# Patient Record
Sex: MALE | Race: WHITE | NOT HISPANIC OR LATINO | Employment: FULL TIME | URBAN - METROPOLITAN AREA
[De-identification: names, ages, dates, MRNs, and addresses within clinical notes are randomized per-mention and may not be internally consistent; named-entity substitution may affect disease eponyms.]

---

## 2017-10-23 ENCOUNTER — TRANSCRIBE ORDERS (OUTPATIENT)
Dept: NUTRITION | Facility: HOSPITAL | Age: 22
End: 2017-10-23

## 2017-10-23 DIAGNOSIS — E11.9 DIABETES MELLITUS WITHOUT COMPLICATION (HCC): Primary | ICD-10-CM

## 2017-11-10 ENCOUNTER — HOSPITAL ENCOUNTER (OUTPATIENT)
Dept: NUTRITION | Facility: HOSPITAL | Age: 22
Setting detail: RECURRING SERIES
Discharge: HOME | End: 2017-11-10

## 2017-11-10 VITALS — BODY MASS INDEX: 25.73 KG/M2 | WEIGHT: 190 LBS | HEIGHT: 72 IN

## 2017-11-10 PROCEDURE — 97802 MEDICAL NUTRITION INDIV IN: CPT | Performed by: DIETITIAN, REGISTERED

## 2017-12-06 ENCOUNTER — HOSPITAL ENCOUNTER (OUTPATIENT)
Dept: NUTRITION | Facility: HOSPITAL | Age: 22
Setting detail: RECURRING SERIES
Discharge: HOME | End: 2017-12-06

## 2017-12-06 VITALS — BODY MASS INDEX: 25.33 KG/M2 | HEIGHT: 72 IN | WEIGHT: 187 LBS

## 2021-08-16 RX ORDER — SERTRALINE HYDROCHLORIDE 25 MG/1
TABLET, FILM COATED ORAL
Qty: 33 TABLET | Refills: 0 | OUTPATIENT
Start: 2021-08-16

## 2022-04-15 ENCOUNTER — OFFICE VISIT (OUTPATIENT)
Dept: FAMILY MEDICINE CLINIC | Facility: CLINIC | Age: 27
End: 2022-04-15

## 2022-04-15 VITALS
HEART RATE: 109 BPM | BODY MASS INDEX: 24.65 KG/M2 | SYSTOLIC BLOOD PRESSURE: 140 MMHG | WEIGHT: 182 LBS | OXYGEN SATURATION: 99 % | HEIGHT: 72 IN | DIASTOLIC BLOOD PRESSURE: 90 MMHG

## 2022-04-15 DIAGNOSIS — F41.9 ANXIETY: Primary | ICD-10-CM

## 2022-04-15 DIAGNOSIS — F32.A DEPRESSION, UNSPECIFIED DEPRESSION TYPE: ICD-10-CM

## 2022-04-15 DIAGNOSIS — I10 PRIMARY HYPERTENSION: ICD-10-CM

## 2022-04-15 DIAGNOSIS — E10.9 TYPE 1 DIABETES MELLITUS WITHOUT COMPLICATION: ICD-10-CM

## 2022-04-15 PROCEDURE — 99214 OFFICE O/P EST MOD 30 MIN: CPT | Performed by: NURSE PRACTITIONER

## 2022-04-15 RX ORDER — DISULFIRAM 250 MG/1
250 TABLET ORAL DAILY
COMMUNITY
Start: 2021-09-13 | End: 2022-04-15

## 2022-04-15 RX ORDER — LISINOPRIL 20 MG/1
20 TABLET ORAL DAILY
Qty: 90 TABLET | Refills: 1 | Status: SHIPPED | OUTPATIENT
Start: 2022-04-15 | End: 2022-08-23 | Stop reason: SDUPTHER

## 2022-04-15 RX ORDER — LISINOPRIL 20 MG/1
20 TABLET ORAL
COMMUNITY
End: 2022-04-15

## 2022-04-15 RX ORDER — FLUOXETINE HYDROCHLORIDE 20 MG/1
20 CAPSULE ORAL DAILY
Qty: 30 CAPSULE | Refills: 5 | Status: SHIPPED | OUTPATIENT
Start: 2022-04-15 | End: 2022-08-23 | Stop reason: SDUPTHER

## 2022-04-15 RX ORDER — CLINDAMYCIN HYDROCHLORIDE 300 MG/1
300 CAPSULE ORAL 4 TIMES DAILY
COMMUNITY
Start: 2022-02-02 | End: 2022-04-15

## 2022-04-15 RX ORDER — CARVEDILOL 12.5 MG/1
12.5 TABLET ORAL
COMMUNITY
End: 2022-04-15

## 2022-04-15 RX ORDER — BUSPIRONE HYDROCHLORIDE 7.5 MG/1
7.5 TABLET ORAL 2 TIMES DAILY
Qty: 60 TABLET | Refills: 5 | Status: SHIPPED | OUTPATIENT
Start: 2022-04-15 | End: 2022-08-23 | Stop reason: SDUPTHER

## 2022-04-15 RX ORDER — LEVOCETIRIZINE DIHYDROCHLORIDE 5 MG/1
5 TABLET, FILM COATED ORAL DAILY
COMMUNITY
Start: 2022-03-17 | End: 2022-10-24 | Stop reason: SDUPTHER

## 2022-04-15 NOTE — ASSESSMENT & PLAN NOTE
Patient sees endocrinology for diabetes.  Continue current medications.  I will send an order for his Dexcom supplies when he brings me the list.

## 2022-04-15 NOTE — PROGRESS NOTES
"Chief Complaint  Diabetes    Subjective          Parth Bright presents to [facility]  Patient is here today to discuss his anxiety.  He has had increased feelings of anxiety and depression in the past few months.  He is dealing with custody issues with his children and that has definitely worsened his symptoms.  He sees endocrinology for his diabetes which has not been controlled at times.  He has had periods where he has not been taking his insulin.  He states he gets his insulin from [facility] and does not need a prescription for that.      Objective   Vital Signs:   /90   Pulse 109   Ht 182.9 cm (72\")   Wt 82.6 kg (182 lb)   SpO2 99%   BMI 24.68 kg/m²     Body mass index is 24.68 kg/m².    Review of Systems   Constitutional: Negative for fatigue and fever.   Respiratory: Negative for shortness of breath.    Cardiovascular: Negative for chest pain, palpitations and leg swelling.   Neurological: Negative for syncope.   Psychiatric/Behavioral: The patient is nervous/anxious.         Negative depression          Current Outpatient Medications:   •  HumaLOG 100 UNIT/ML injection, , Disp: , Rfl:   •  Insulin Glargine (LANTUS SOLOSTAR) 100 UNIT/ML injection pen, 35 units at night, Disp: , Rfl:   •  Insulin Lispro (HUMALOG PEN SC), per SSI provided today qa, Disp: , Rfl:   •  levocetirizine (XYZAL) 5 MG tablet, Take 5 mg by mouth Daily., Disp: , Rfl:   •  lisinopril (PRINIVIL,ZESTRIL) 20 MG tablet, Take 1 tablet by mouth Daily., Disp: 90 tablet, Rfl: 1  •  busPIRone (BUSPAR) 7.5 MG tablet, Take 1 tablet by mouth 2 (Two) Times a Day., Disp: 60 tablet, Rfl: 5  •  FLUoxetine (PROzac) 20 MG capsule, Take 1 capsule by mouth Daily., Disp: 30 capsule, Rfl: 5      Allergies: Penicillins    Physical Exam  Constitutional:       Appearance: Normal appearance.   HENT:      Head: Normocephalic.   Eyes:      Conjunctiva/sclera: Conjunctivae normal.      Pupils: Pupils are equal, round, and " reactive to light.   Cardiovascular:      Rate and Rhythm: Normal rate and regular rhythm.      Heart sounds: Normal heart sounds.   Pulmonary:      Effort: Pulmonary effort is normal.      Breath sounds: Normal breath sounds.   Abdominal:      Tenderness: There is no abdominal tenderness.   Musculoskeletal:         General: Normal range of motion.   Skin:     General: Skin is warm and dry.      Capillary Refill: Capillary refill takes less than 2 seconds.   Neurological:      General: No focal deficit present.      Mental Status: He is alert and oriented to person, place, and time.   Psychiatric:         Mood and Affect: Mood normal.         Behavior: Behavior normal.         Thought Content: Thought content normal.         Judgment: Judgment normal.          Result Review :                   Assessment and Plan    Diagnoses and all orders for this visit:    1. Anxiety (Primary)  Assessment & Plan:  Begin medications.  Follow-up in 1 month or sooner if worsen.    Orders:  -     FLUoxetine (PROzac) 20 MG capsule; Take 1 capsule by mouth Daily.  Dispense: 30 capsule; Refill: 5  -     busPIRone (BUSPAR) 7.5 MG tablet; Take 1 tablet by mouth 2 (Two) Times a Day.  Dispense: 60 tablet; Refill: 5    2. Depression, unspecified depression type  Assessment & Plan:  Begin medications.  Follow-up in 1 month or sooner if worsened.    Orders:  -     FLUoxetine (PROzac) 20 MG capsule; Take 1 capsule by mouth Daily.  Dispense: 30 capsule; Refill: 5    3. Type 1 diabetes mellitus without complication (HCC)  Assessment & Plan:  Patient sees endocrinology for diabetes.  Continue current medications.  I will send an order for his Dexcom supplies when he brings me the list.      4. Primary hypertension  Assessment & Plan:  Restart medications.  Monitor blood pressure.      Other orders  -     lisinopril (PRINIVIL,ZESTRIL) 20 MG tablet; Take 1 tablet by mouth Daily.  Dispense: 90 tablet; Refill: 1            BMI is within normal  parameters. No follow-up required.       Follow Up   Return in about 1 month (around 5/15/2022) for Recheck.  Patient was given instructions and counseling regarding his condition or for health maintenance advice. Please see specific information pulled into the AVS if appropriate.     CIARA Chun

## 2022-05-16 ENCOUNTER — OFFICE VISIT (OUTPATIENT)
Dept: FAMILY MEDICINE CLINIC | Facility: CLINIC | Age: 27
End: 2022-05-16

## 2022-05-16 ENCOUNTER — TELEPHONE (OUTPATIENT)
Dept: FAMILY MEDICINE CLINIC | Facility: CLINIC | Age: 27
End: 2022-05-16

## 2022-05-16 VITALS
DIASTOLIC BLOOD PRESSURE: 82 MMHG | SYSTOLIC BLOOD PRESSURE: 122 MMHG | HEART RATE: 111 BPM | BODY MASS INDEX: 24.79 KG/M2 | HEIGHT: 72 IN | OXYGEN SATURATION: 98 % | WEIGHT: 183 LBS

## 2022-05-16 DIAGNOSIS — E10.9 TYPE 1 DIABETES MELLITUS WITHOUT COMPLICATION: ICD-10-CM

## 2022-05-16 DIAGNOSIS — F41.9 ANXIETY: Primary | ICD-10-CM

## 2022-05-16 PROBLEM — F10.20 SEVERE ALCOHOL USE DISORDER: Status: ACTIVE | Noted: 2021-07-19

## 2022-05-16 PROCEDURE — 99213 OFFICE O/P EST LOW 20 MIN: CPT | Performed by: NURSE PRACTITIONER

## 2022-05-16 NOTE — PROGRESS NOTES
"Chief Complaint  Anxiety    Subjective          Parth Bright presents to [facility]  Patient is here for follow-up on his anxiety.  He feels the medication had been working but he stopped taking it last week when he had a stomach bug.  He needs refills on his insulin and pump supplies.  He needs refills on his Dexcom supplies.      Objective   Vital Signs:   /82   Pulse 111   Ht 182.9 cm (72\")   Wt 83 kg (183 lb)   SpO2 98%   BMI 24.82 kg/m²     Body mass index is 24.82 kg/m².    Review of Systems   Constitutional: Negative for fatigue and fever.   Respiratory: Negative for shortness of breath.    Cardiovascular: Negative for chest pain, palpitations and leg swelling.   Neurological: Negative for syncope.   Psychiatric/Behavioral: The patient is not nervous/anxious.         Negative depression          Current Outpatient Medications:   •  busPIRone (BUSPAR) 7.5 MG tablet, Take 1 tablet by mouth 2 (Two) Times a Day., Disp: 60 tablet, Rfl: 5  •  FLUoxetine (PROzac) 20 MG capsule, Take 1 capsule by mouth Daily., Disp: 30 capsule, Rfl: 5  •  HumaLOG 100 UNIT/ML injection, Inject SC with insulin pump, as directed, Disp: 30 mL, Rfl: 5  •  Insulin Lispro (HUMALOG PEN SC), per SSI provided today [facility], Disp: , Rfl:   •  levocetirizine (XYZAL) 5 MG tablet, Take 5 mg by mouth Daily., Disp: , Rfl:   •  Insulin Glargine (LANTUS SOLOSTAR) 100 UNIT/ML injection pen, 35 units at night, Disp: , Rfl:   •  lisinopril (PRINIVIL,ZESTRIL) 20 MG tablet, Take 1 tablet by mouth Daily., Disp: 90 tablet, Rfl: 1      Allergies: Penicillins    Physical Exam  Constitutional:       Appearance: Normal appearance.   HENT:      Head: Normocephalic.   Eyes:      Conjunctiva/sclera: Conjunctivae normal.      Pupils: Pupils are equal, round, and reactive to light.   Cardiovascular:      Rate and Rhythm: Normal rate and regular rhythm.      Heart sounds: Normal heart sounds.   Pulmonary:      Effort: Pulmonary " effort is normal.      Breath sounds: Normal breath sounds.   Abdominal:      Tenderness: There is no abdominal tenderness.   Musculoskeletal:         General: Normal range of motion.   Skin:     General: Skin is warm and dry.      Capillary Refill: Capillary refill takes less than 2 seconds.   Neurological:      General: No focal deficit present.      Mental Status: He is alert and oriented to person, place, and time.   Psychiatric:         Mood and Affect: Mood normal.         Behavior: Behavior normal.         Thought Content: Thought content normal.         Judgment: Judgment normal.          Result Review :                   Assessment and Plan    Diagnoses and all orders for this visit:    1. Anxiety (Primary)  Comments:  Continue current medications.  Follow-up in 1 month.  We may adjust medication if needed.    2. Type 1 diabetes mellitus without complication (HCC)  Comments:  Continue insulin as directed.  Check glucose 3 times daily and as needed.  We discussed the importance of compliance to avoid complications.  Orders:  -     HumaLOG 100 UNIT/ML injection; Inject SC with insulin pump, as directed  Dispense: 30 mL; Refill: 5              BMI is within normal parameters. No follow-up required.       Follow Up   Return in about 6 months (around 11/16/2022) for Recheck.  Patient was given instructions and counseling regarding his condition or for health maintenance advice. Please see specific information pulled into the AVS if appropriate.     CIARA Chun

## 2022-07-26 ENCOUNTER — TELEPHONE (OUTPATIENT)
Dept: FAMILY MEDICINE CLINIC | Facility: CLINIC | Age: 27
End: 2022-07-26

## 2022-07-26 NOTE — TELEPHONE ENCOUNTER
Caller: BELKIS    Relationship: [facility]    Best call back number: [phone]    What form or medical record are you requesting: EDITH, TIFFANIE 4 CDMS    Who is requesting this form or medical record from you: [facility]    How would you like to receive the form or medical records (pick-up, mail, fax): FAX # - [phone]    Timeframe paperwork needed: ASAP    Additional notes: BELKIS FROM [facility] FAXED ON 07/19/22 FOR PAPERWORK FOR PATIENT. BELKIS HAS CORRECT FAX # AND I ASKED BELKIS TO REFAX THE PAPERWORK BACK OVER TO US.

## 2022-08-05 DIAGNOSIS — E10.9 TYPE 1 DIABETES MELLITUS WITHOUT COMPLICATION: ICD-10-CM

## 2022-08-05 NOTE — TELEPHONE ENCOUNTER
Incoming Refill Request      Medication requested (name and dose): [facility]    Pharmacy where request should be sent: [facility]    Additional details provided by patient: PT IS ALMOST OUT, DOES NOT HAVE ENOUGH TO FILL  INSULIN PUMP    Best call back number: [phone]    Does the patient have less than a 3 day supply:  [x] Yes  [] No    Pallavi Brown Rep  08/05/22, 14:27 EDT

## 2022-08-05 NOTE — TELEPHONE ENCOUNTER
"We've not prescribed since 2019 it looks like. I'll forward to crystal to see if we can.     Directions from pharm say \"inject SQ with insulin pump, as directed\"    Christiana says \"inject 5-10units by SQ at each meal per sliding scale\"   "

## 2022-08-23 ENCOUNTER — TELEPHONE (OUTPATIENT)
Dept: FAMILY MEDICINE CLINIC | Facility: CLINIC | Age: 27
End: 2022-08-23

## 2022-08-23 ENCOUNTER — OFFICE VISIT (OUTPATIENT)
Dept: FAMILY MEDICINE CLINIC | Facility: CLINIC | Age: 27
End: 2022-08-23

## 2022-08-23 VITALS
HEART RATE: 93 BPM | OXYGEN SATURATION: 99 % | SYSTOLIC BLOOD PRESSURE: 110 MMHG | RESPIRATION RATE: 16 BRPM | WEIGHT: 188.8 LBS | DIASTOLIC BLOOD PRESSURE: 80 MMHG | BODY MASS INDEX: 25.57 KG/M2 | HEIGHT: 72 IN | TEMPERATURE: 97.6 F

## 2022-08-23 DIAGNOSIS — F41.9 ANXIETY: ICD-10-CM

## 2022-08-23 DIAGNOSIS — E10.9 TYPE 1 DIABETES MELLITUS WITHOUT COMPLICATION: ICD-10-CM

## 2022-08-23 DIAGNOSIS — F32.A DEPRESSION, UNSPECIFIED DEPRESSION TYPE: ICD-10-CM

## 2022-08-23 DIAGNOSIS — L98.9 SKIN LESION: Primary | ICD-10-CM

## 2022-08-23 DIAGNOSIS — I10 PRIMARY HYPERTENSION: ICD-10-CM

## 2022-08-23 PROCEDURE — 99213 OFFICE O/P EST LOW 20 MIN: CPT | Performed by: NURSE PRACTITIONER

## 2022-08-23 RX ORDER — FLUOXETINE HYDROCHLORIDE 20 MG/1
20 CAPSULE ORAL DAILY
Qty: 90 CAPSULE | Refills: 3 | Status: SHIPPED | OUTPATIENT
Start: 2022-08-23 | End: 2023-03-23

## 2022-08-23 RX ORDER — BUSPIRONE HYDROCHLORIDE 7.5 MG/1
7.5 TABLET ORAL 2 TIMES DAILY
Qty: 180 TABLET | Refills: 3 | Status: SHIPPED | OUTPATIENT
Start: 2022-08-23 | End: 2023-03-23

## 2022-08-23 RX ORDER — LISINOPRIL 20 MG/1
20 TABLET ORAL DAILY
Qty: 90 TABLET | Refills: 3 | Status: SHIPPED | OUTPATIENT
Start: 2022-08-23 | End: 2022-10-24

## 2022-08-23 RX ORDER — ASPIRIN 81 MG/1
TABLET, FILM COATED ORAL
COMMUNITY
Start: 2022-07-25 | End: 2022-10-24

## 2022-08-23 NOTE — TELEPHONE ENCOUNTER
Caller: [facility] [address] [place] [address] - [phone] [facility] [phone] FX    Relationship: Pharmacy    Best call back number: [phone]    Which medication are you concerned about: HumaLOG 100 UNIT/ML injection    What are your concerns: PHARMACY NEEDS TO KNOW THE MAXIMUM UNITS THE PATIENT CAN TAKE IN A DAY.    PLEASE CALL BACK TO ADVISE.     THANK YOU.

## 2022-08-23 NOTE — PROGRESS NOTES
"Chief Complaint  Diabetes (Routine check up. PT is not fasting)    Subjective          Parth Bright presents to [facility]  Pt is here for follow up on diabetes. He has been trying to watch his diet more closely. He been wearing his insulin pump and his glucose runs under 200 for the most part. He had his A1C tested for surgery last month which was 8. He ran out of Buspar and Prozac and feels he needs to restart them. He has had a skin lesion on his left arm.      Objective   Vital Signs:   /80 (BP Location: Left arm, Patient Position: Sitting, Cuff Size: Adult)   Pulse 93   Temp 97.6 °F (36.4 °C)   Resp 16   Ht 182.9 cm (72\")   Wt 85.6 kg (188 lb 12.8 oz)   SpO2 99%   BMI 25.61 kg/m²     Body mass index is 25.61 kg/m².    Review of Systems   Constitutional: Negative for fatigue and fever.   Respiratory: Negative for shortness of breath.    Cardiovascular: Negative for chest pain, palpitations and leg swelling.   Neurological: Negative for syncope.   Psychiatric/Behavioral: The patient is not nervous/anxious.           Current Outpatient Medications:   •  Adult Aspirin Regimen 81 MG EC tablet, , Disp: , Rfl:   •  busPIRone (BUSPAR) 7.5 MG tablet, Take 1 tablet by mouth 2 (Two) Times a Day., Disp: 180 tablet, Rfl: 3  •  FLUoxetine (PROzac) 20 MG capsule, Take 1 capsule by mouth Daily., Disp: 90 capsule, Rfl: 3  •  HumaLOG 100 UNIT/ML injection, Inject SC with insulin pump, as directed, Disp: 90 mL, Rfl: 5  •  lisinopril (PRINIVIL,ZESTRIL) 20 MG tablet, Take 1 tablet by mouth Daily., Disp: 90 tablet, Rfl: 3  •  levocetirizine (XYZAL) 5 MG tablet, Take 5 mg by mouth Daily., Disp: , Rfl:       Allergies: Penicillins, Citrullus vulgaris, Nuts, Levofloxacin, and Nickel    Physical Exam  Constitutional:       Appearance: Normal appearance.   HENT:      Head: Normocephalic.   Eyes:      Conjunctiva/sclera: Conjunctivae normal.      Pupils: Pupils are equal, round, and reactive to " light.   Cardiovascular:      Rate and Rhythm: Normal rate and regular rhythm.      Heart sounds: Normal heart sounds.   Pulmonary:      Effort: Pulmonary effort is normal.      Breath sounds: Normal breath sounds.   Abdominal:      Tenderness: There is no abdominal tenderness.   Musculoskeletal:         General: Normal range of motion.   Skin:     General: Skin is warm and dry.      Capillary Refill: Capillary refill takes less than 2 seconds.      Comments: Skin lesion left arm   Neurological:      General: No focal deficit present.      Mental Status: He is alert and oriented to person, place, and time.   Psychiatric:         Mood and Affect: Mood normal.         Behavior: Behavior normal.         Thought Content: Thought content normal.         Judgment: Judgment normal.          Result Review :                   Assessment and Plan    Diagnoses and all orders for this visit:    1. Skin lesion (Primary)  Comments:  Follow up with derm.  Orders:  -     Ambulatory Referral to Dermatology    2. Type 1 diabetes mellitus without complication (HCC)  Comments:  Continue insulin as directed.  Check glucose 3 times daily and as needed. Return for labs in 3 months.   Orders:  -     HumaLOG 100 UNIT/ML injection; Inject SC with insulin pump, as directed  Dispense: 90 mL; Refill: 5    3. Anxiety  Comments:  Restart meds. Return in 1 month if not improving.   Orders:  -     busPIRone (BUSPAR) 7.5 MG tablet; Take 1 tablet by mouth 2 (Two) Times a Day.  Dispense: 180 tablet; Refill: 3  -     FLUoxetine (PROzac) 20 MG capsule; Take 1 capsule by mouth Daily.  Dispense: 90 capsule; Refill: 3    4. Depression, unspecified depression type  Comments:  Restart meds. Return in 1 month if not improving.   Orders:  -     FLUoxetine (PROzac) 20 MG capsule; Take 1 capsule by mouth Daily.  Dispense: 90 capsule; Refill: 3    5. Primary hypertension  Comments:  Continue current meds.   Orders:  -     lisinopril (PRINIVIL,ZESTRIL) 20 MG  tablet; Take 1 tablet by mouth Daily.  Dispense: 90 tablet; Refill: 3                Follow Up   Return in about 6 months (around 2/23/2023) for Recheck.  Patient was given instructions and counseling regarding his condition or for health maintenance advice. Please see specific information pulled into the AVS if appropriate.     CIARA Chun

## 2022-10-24 ENCOUNTER — OFFICE VISIT (OUTPATIENT)
Dept: FAMILY MEDICINE CLINIC | Facility: CLINIC | Age: 27
End: 2022-10-24

## 2022-10-24 VITALS
SYSTOLIC BLOOD PRESSURE: 122 MMHG | WEIGHT: 198 LBS | BODY MASS INDEX: 26.82 KG/M2 | OXYGEN SATURATION: 98 % | HEIGHT: 72 IN | HEART RATE: 98 BPM | DIASTOLIC BLOOD PRESSURE: 82 MMHG

## 2022-10-24 DIAGNOSIS — Z00.00 ROUTINE MEDICAL EXAM: Primary | ICD-10-CM

## 2022-10-24 DIAGNOSIS — E10.9 TYPE 1 DIABETES MELLITUS WITHOUT COMPLICATION: ICD-10-CM

## 2022-10-24 DIAGNOSIS — R53.83 OTHER FATIGUE: ICD-10-CM

## 2022-10-24 DIAGNOSIS — E56.9 VITAMIN DEFICIENCY: ICD-10-CM

## 2022-10-24 DIAGNOSIS — F41.1 GENERALIZED ANXIETY DISORDER: ICD-10-CM

## 2022-10-24 DIAGNOSIS — J30.1 ALLERGIC RHINITIS DUE TO POLLEN, UNSPECIFIED SEASONALITY: ICD-10-CM

## 2022-10-24 DIAGNOSIS — J01.00 ACUTE NON-RECURRENT MAXILLARY SINUSITIS: ICD-10-CM

## 2022-10-24 PROCEDURE — 99395 PREV VISIT EST AGE 18-39: CPT | Performed by: NURSE PRACTITIONER

## 2022-10-24 PROCEDURE — 3008F BODY MASS INDEX DOCD: CPT | Performed by: NURSE PRACTITIONER

## 2022-10-24 PROCEDURE — 2014F MENTAL STATUS ASSESS: CPT | Performed by: NURSE PRACTITIONER

## 2022-10-24 PROCEDURE — 36415 COLL VENOUS BLD VENIPUNCTURE: CPT | Performed by: NURSE PRACTITIONER

## 2022-10-24 RX ORDER — LEVOCETIRIZINE DIHYDROCHLORIDE 5 MG/1
5 TABLET, FILM COATED ORAL DAILY
Qty: 90 TABLET | Refills: 3 | Status: SHIPPED | OUTPATIENT
Start: 2022-10-24

## 2022-10-24 RX ORDER — CEFDINIR 300 MG/1
300 CAPSULE ORAL 2 TIMES DAILY
Qty: 20 CAPSULE | Refills: 0 | Status: SHIPPED | OUTPATIENT
Start: 2022-10-24 | End: 2023-03-23

## 2022-10-24 NOTE — PROGRESS NOTES
"Chief Complaint  Diabetes (Staying out of DKA, has to keep pump on or sugars jump up. ) and drainage (Had covid a couple of months ago and ended up in the hospital. Can't clear up drainage. )    Subjective          Parth Bright presents to [facility] for preventative yearly exam.   History of Present Illness  Patient is here for physical exam.  His blood sugar has been elevated at times and he has been hospitalized for DKA within the past year.  He had COVID approximately 2 months ago and was hospitalized.  He states he has had congestion and sinus drainage since then.  Diabetes  Pertinent negatives for hypoglycemia include no nervousness/anxiousness. Pertinent negatives for diabetes include no chest pain and no fatigue.       Objective   Vital Signs:   /82   Pulse 98   Ht 182.9 cm (72\")   Wt 89.8 kg (198 lb)   SpO2 98%   BMI 26.85 kg/m²     Body mass index is 26.85 kg/m².    Predictive Model Details   No score data available for Risk of Fall        PHQ-9 Depression Screening  Little interest or pleasure in doing things?     Feeling down, depressed, or hopeless?     Trouble falling or staying asleep, or sleeping too much?     Feeling tired or having little energy?     Poor appetite or overeating?     Feeling bad about yourself - or that you are a failure or have let yourself or your family down?     Trouble concentrating on things, such as reading the newspaper or watching television?     Moving or speaking so slowly that other people could have noticed? Or the opposite - being so fidgety or restless that you have been moving around a lot more than usual?     Thoughts that you would be better off dead, or of hurting yourself in some way?     PHQ-9 Total Score     If you checked off any problems, how difficult have these problems made it for you to do your work, take care of things at home, or get along with other people?       Health Maintenance   Topic Date Due   • " Hepatitis B (1 of 3 - 3-dose series) Never done   • URINE MICROALBUMIN  Never done   • COVID-19 Vaccine (1) Never done   • ANNUAL PHYSICAL  Never done   • Pneumococcal Vaccine 0-64 (1 - PCV) Never done   • TDAP/TD VACCINES (2 - Td or Tdap) 08/04/2018   • HEPATITIS C SCREENING  Never done   • DIABETIC FOOT EXAM  Never done   • INFLUENZA VACCINE  Never done   • HEMOGLOBIN A1C  02/28/2023   • DIABETIC EYE EXAM  05/27/2023        Immunization History   Administered Date(s) Administered   • Tdap 08/04/2008       Review of Systems   Constitutional: Negative for fatigue and fever.   HENT: Positive for congestion.    Respiratory: Negative for shortness of breath.    Cardiovascular: Negative for chest pain, palpitations and leg swelling.   Neurological: Negative for syncope.   Psychiatric/Behavioral: The patient is not nervous/anxious.         Past History:  Medical History: has a past medical history of Diabetes mellitus type 1 (HCC).   Surgical History: has a past surgical history that includes Knee surgery (Right, 07/25/2022).   Family History: family history includes Diabetes type I in his mother.   Social History: reports that he has never smoked. He uses smokeless tobacco. He reports that he does not currently use alcohol. He reports that he does not use drugs.       Allergies: Penicillins, Citrullus vulgaris, Nuts, Levofloxacin, and Nickel    Physical Exam  Constitutional:       Appearance: Normal appearance.   HENT:      Head: Normocephalic.   Eyes:      Conjunctiva/sclera: Conjunctivae normal.      Pupils: Pupils are equal, round, and reactive to light.   Cardiovascular:      Rate and Rhythm: Normal rate and regular rhythm.      Heart sounds: Normal heart sounds.   Pulmonary:      Effort: Pulmonary effort is normal.      Breath sounds: Normal breath sounds.   Abdominal:      Tenderness: There is no abdominal tenderness.   Musculoskeletal:         General: Normal range of motion.   Skin:     General: Skin is warm and  dry.      Capillary Refill: Capillary refill takes less than 2 seconds.   Neurological:      General: No focal deficit present.      Mental Status: He is alert and oriented to person, place, and time.   Psychiatric:         Mood and Affect: Mood normal.         Behavior: Behavior normal.         Thought Content: Thought content normal.         Judgment: Judgment normal.          Result Review :                   Assessment and Plan    Diagnoses and all orders for this visit:    1. Routine medical exam (Primary)  Comments:  We discussed diet, exercise, and preventative counseling.  Labs drawn.    2. Type 1 diabetes mellitus without complication (HCC)  Comments:  Labs drawn.  We discussed the importance of compliance.  Monitor blood sugar.  We discussed diet and exercise.  Orders:  -     Comprehensive Metabolic Panel; Future  -     Hemoglobin A1c; Future  -     Comprehensive Metabolic Panel  -     Hemoglobin A1c    3. Vitamin deficiency  -     CBC & Differential; Future  -     Vitamin B12; Future  -     Vitamin D,25-Hydroxy; Future  -     Folate; Future  -     CBC & Differential  -     Vitamin B12  -     Vitamin D,25-Hydroxy  -     Folate    4. Other fatigue  -     CBC & Differential; Future  -     TSH; Future  -     CBC & Differential  -     TSH    5. Generalized anxiety disorder  Comments:  Continue current medications    6. Acute non-recurrent maxillary sinusitis  Comments:  Finish antibiotics.  Mucinex and restart allergy pills.  Return if not improving in 1 week or sooner for worsening symptoms.  Orders:  -     cefdinir (OMNICEF) 300 MG capsule; Take 1 capsule by mouth 2 (Two) Times a Day.  Dispense: 20 capsule; Refill: 0    7. Allergic rhinitis due to pollen, unspecified seasonality  -     levocetirizine (XYZAL) 5 MG tablet; Take 1 tablet by mouth Daily.  Dispense: 90 tablet; Refill: 3                Current Outpatient Medications:   •  busPIRone (BUSPAR) 7.5 MG tablet, Take 1 tablet by mouth 2 (Two) Times a  Day., Disp: 180 tablet, Rfl: 3  •  FLUoxetine (PROzac) 20 MG capsule, Take 1 capsule by mouth Daily., Disp: 90 capsule, Rfl: 3  •  HumaLOG 100 UNIT/ML injection, Inject 4-18 units QID SC with insulin pump, as directed, Disp: 90 mL, Rfl: 5  •  levocetirizine (XYZAL) 5 MG tablet, Take 1 tablet by mouth Daily., Disp: 90 tablet, Rfl: 3  •  cefdinir (OMNICEF) 300 MG capsule, Take 1 capsule by mouth 2 (Two) Times a Day., Disp: 20 capsule, Rfl: 0    Follow Up   Return in about 1 week (around 10/31/2022) for if not improving or sooner if symptoms worsen.  Patient was given instructions and counseling regarding his condition or for health maintenance advice. Please see specific information pulled into the AVS if appropriate.     CIARA Chun

## 2022-10-25 ENCOUNTER — TELEPHONE (OUTPATIENT)
Dept: FAMILY MEDICINE CLINIC | Facility: CLINIC | Age: 27
End: 2022-10-25

## 2022-10-25 LAB
25(OH)D3+25(OH)D2 SERPL-MCNC: 22.6 NG/ML (ref 30–100)
ALBUMIN SERPL-MCNC: 4.9 G/DL (ref 4.1–5.2)
ALBUMIN/GLOB SERPL: 2.1 {RATIO} (ref 1.2–2.2)
ALP SERPL-CCNC: 153 IU/L (ref 44–121)
ALT SERPL-CCNC: 17 IU/L (ref 0–44)
AST SERPL-CCNC: 16 IU/L (ref 0–40)
BASOPHILS # BLD AUTO: 0 X10E3/UL (ref 0–0.2)
BASOPHILS NFR BLD AUTO: 1 %
BILIRUB SERPL-MCNC: 0.5 MG/DL (ref 0–1.2)
BUN SERPL-MCNC: 22 MG/DL (ref 6–20)
BUN/CREAT SERPL: 24 (ref 9–20)
CALCIUM SERPL-MCNC: 10.6 MG/DL (ref 8.7–10.2)
CHLORIDE SERPL-SCNC: 92 MMOL/L (ref 96–106)
CO2 SERPL-SCNC: 24 MMOL/L (ref 20–29)
CREAT SERPL-MCNC: 0.9 MG/DL (ref 0.76–1.27)
EGFRCR SERPLBLD CKD-EPI 2021: 119 ML/MIN/1.73
EOSINOPHIL # BLD AUTO: 0.2 X10E3/UL (ref 0–0.4)
EOSINOPHIL NFR BLD AUTO: 4 %
ERYTHROCYTE [DISTWIDTH] IN BLOOD BY AUTOMATED COUNT: 12.3 % (ref 11.6–15.4)
FOLATE SERPL-MCNC: 18.6 NG/ML
GLOBULIN SER CALC-MCNC: 2.3 G/DL (ref 1.5–4.5)
GLUCOSE SERPL-MCNC: 609 MG/DL (ref 70–99)
HBA1C MFR BLD: 9.4 % (ref 4.8–5.6)
HCT VFR BLD AUTO: 48.9 % (ref 37.5–51)
HGB BLD-MCNC: 16 G/DL (ref 13–17.7)
IMM GRANULOCYTES # BLD AUTO: 0 X10E3/UL (ref 0–0.1)
IMM GRANULOCYTES NFR BLD AUTO: 0 %
LYMPHOCYTES # BLD AUTO: 1.3 X10E3/UL (ref 0.7–3.1)
LYMPHOCYTES NFR BLD AUTO: 27 %
MCH RBC QN AUTO: 29.6 PG (ref 26.6–33)
MCHC RBC AUTO-ENTMCNC: 32.7 G/DL (ref 31.5–35.7)
MCV RBC AUTO: 91 FL (ref 79–97)
MONOCYTES # BLD AUTO: 0.4 X10E3/UL (ref 0.1–0.9)
MONOCYTES NFR BLD AUTO: 9 %
NEUTROPHILS # BLD AUTO: 2.9 X10E3/UL (ref 1.4–7)
NEUTROPHILS NFR BLD AUTO: 59 %
PLATELET # BLD AUTO: 183 X10E3/UL (ref 150–450)
POTASSIUM SERPL-SCNC: 4.6 MMOL/L (ref 3.5–5.2)
PROT SERPL-MCNC: 7.2 G/DL (ref 6–8.5)
RBC # BLD AUTO: 5.4 X10E6/UL (ref 4.14–5.8)
SODIUM SERPL-SCNC: 132 MMOL/L (ref 134–144)
TSH SERPL DL<=0.005 MIU/L-ACNC: 2.86 UIU/ML (ref 0.45–4.5)
VIT B12 SERPL-MCNC: 420 PG/ML (ref 232–1245)
WBC # BLD AUTO: 4.9 X10E3/UL (ref 3.4–10.8)

## 2022-10-25 NOTE — TELEPHONE ENCOUNTER
Will you let pt know that his glucose was 609 yesterday when we checked his labs. He has to do better about taking his insulin and watching his diet or he will end up back in the hospital with DKA. Let's get him back to his endocrinologist for follow up. Who does he see? His vitamin D was low so take 2,000 units of vitamin D daily.  Thanks!

## 2022-10-26 NOTE — TELEPHONE ENCOUNTER
HUB RELAYED MESSAGE:  PT WILL LIKE TO KNOW WHAT A1C IS AND ALSO STATED THAT HIS ENDOCRINOLOGIST IS AYESHA GREY AT .    PLEASE ADVISE.CALL BACK:[phone]

## 2022-10-26 NOTE — TELEPHONE ENCOUNTER
"HUB TO READ:    \"Will you let pt know that his glucose was 609 yesterday when we checked his labs. He has to do better about taking his insulin and watching his diet or he will end up back in the hospital with DKA. Let's get him back to his endocrinologist for follow up. Who does he see? His vitamin D was low so take 2,000 units of vitamin D daily.  Thanks!\"    Left message  "

## 2022-10-28 NOTE — TELEPHONE ENCOUNTER
HUB READ MESSAGE TO PATIENT. HE STATES THAT HE WILL CALL ENDOCRINOLOGY TO MAKE A FOLLOW UP APPOINTMENT.

## 2022-10-28 NOTE — TELEPHONE ENCOUNTER
A1C was 9.4 which is actually a little better than when we last checked 2 months ago. I'd like him to follow up with his endo. Does he want us to make the appt or will he call? Thanks!

## 2022-10-28 NOTE — TELEPHONE ENCOUNTER
HUB TO READ    Left  to return call.   A1C was 9.4 which is actually a little better than when we last checked 2 months ago. Wendy would like him to follow up with his endo. Does he want us to make the appt or will he call? Thanks!

## 2023-01-09 DIAGNOSIS — E10.9 TYPE 1 DIABETES MELLITUS WITHOUT COMPLICATION: ICD-10-CM

## 2023-01-09 NOTE — TELEPHONE ENCOUNTER
Caller: Patrh Bright    Relationship: Self    Best call back number: [phone]    Requested Prescriptions:   Requested Prescriptions     Pending Prescriptions Disp Refills   • HumaLOG 100 UNIT/ML injection 90 mL 5     Sig: Inject 4-18 units QID SC with insulin pump, as directed        Pharmacy where request should be sent: [facility] [address] [place] [address] [phone] [facility] [phone] FX     Additional details provided by patient: PATIENT HAS 2 DAYS LEFT OF THIS MEDICATION.    Does the patient have less than a 3 day supply:  [x] Yes  [] No    Would you like a call back once the refill request has been completed: [x] Yes [] No    If the office needs to give you a call back, can they leave a voicemail: [x] Yes [] No    Pallavi Pennington Rep   01/09/23 15:55 EST

## 2023-03-16 ENCOUNTER — TELEPHONE (OUTPATIENT)
Dept: FAMILY MEDICINE CLINIC | Facility: CLINIC | Age: 28
End: 2023-03-16

## 2023-03-16 NOTE — TELEPHONE ENCOUNTER
Caller: KELLIE WITH [facility]    Relationship: Provider    Best call back number: [phone] OPTION 8    What form or medical record are you requesting: FAXED OVER RECORDS REQUEST AND REQUEST FOR ORDERS     Who is requesting this form or medical record from you: INSURANCE     How would you like to receive the form or medical records (pick-up, mail, fax): FAX  If fax, what is the fax number: [phone]      Timeframe paperwork needed: AS SOON AS POSSIBLE     Additional notes: PLEASE SEE FAXED REQUEST, SENT AGAIN TODAY 3.16.23

## 2023-03-23 ENCOUNTER — OFFICE VISIT (OUTPATIENT)
Dept: FAMILY MEDICINE CLINIC | Facility: CLINIC | Age: 28
End: 2023-03-23
Payer: MEDICAID

## 2023-03-23 VITALS
DIASTOLIC BLOOD PRESSURE: 80 MMHG | TEMPERATURE: 97.6 F | HEIGHT: 72 IN | OXYGEN SATURATION: 99 % | BODY MASS INDEX: 26.02 KG/M2 | HEART RATE: 99 BPM | WEIGHT: 192.13 LBS | SYSTOLIC BLOOD PRESSURE: 138 MMHG

## 2023-03-23 DIAGNOSIS — R05.1 ACUTE COUGH: Primary | ICD-10-CM

## 2023-03-23 PROBLEM — R05.9 COUGH: Status: ACTIVE | Noted: 2023-03-23

## 2023-03-23 PROCEDURE — 3079F DIAST BP 80-89 MM HG: CPT | Performed by: NURSE PRACTITIONER

## 2023-03-23 PROCEDURE — 1159F MED LIST DOCD IN RCRD: CPT | Performed by: NURSE PRACTITIONER

## 2023-03-23 PROCEDURE — 3075F SYST BP GE 130 - 139MM HG: CPT | Performed by: NURSE PRACTITIONER

## 2023-03-23 PROCEDURE — 99213 OFFICE O/P EST LOW 20 MIN: CPT | Performed by: NURSE PRACTITIONER

## 2023-03-23 PROCEDURE — 1160F RVW MEDS BY RX/DR IN RCRD: CPT | Performed by: NURSE PRACTITIONER

## 2023-03-23 RX ORDER — ALBUTEROL SULFATE 90 UG/1
2 AEROSOL, METERED RESPIRATORY (INHALATION) EVERY 4 HOURS PRN
Qty: 18 G | Refills: 0 | Status: SHIPPED | OUTPATIENT
Start: 2023-03-23

## 2023-03-23 RX ORDER — CEFDINIR 300 MG/1
300 CAPSULE ORAL 2 TIMES DAILY
Qty: 20 CAPSULE | Refills: 0 | Status: SHIPPED | OUTPATIENT
Start: 2023-03-23

## 2023-03-23 RX ORDER — PROCHLORPERAZINE 25 MG/1
SUPPOSITORY RECTAL
COMMUNITY
Start: 2023-02-20

## 2023-03-23 RX ORDER — FLUTICASONE PROPIONATE 50 MCG
SPRAY, SUSPENSION (ML) NASAL
COMMUNITY
Start: 2023-02-09

## 2023-03-23 RX ORDER — INSULIN LISPRO 100 [IU]/ML
INJECTION, SOLUTION INTRAVENOUS; SUBCUTANEOUS
COMMUNITY
Start: 2023-01-09

## 2023-03-23 RX ORDER — PROCHLORPERAZINE 25 MG/1
SUPPOSITORY RECTAL
COMMUNITY
Start: 2023-02-22

## 2023-03-23 NOTE — PROGRESS NOTES
"Chief Complaint  Cough    Subjective          Parth Bright presents to [facility]  History of Present Illness     Patient states for the past 1+ month he has had an annoying cough that is productive at times.  He states he had previously been in the [facility] and they were told that they have been exposed to possible black mold so he is unsure if it could be related to that.  Mild wheezing at times.  States he feels fine in his head but it is all the coughing that is bothering him.  No sick contacts that he is aware of.  No fever no chills.  No shortness of breath no trouble breathing no chest pain no chest pressure.  No GI issues.    Patient also informed me that he is getting his type 1 diabetes under control.  He continues to follow-up with his endocrinologist with .  He states he does have an insulin pump and monitors his blood sugars closely.    Objective   Vital Signs:   /80   Pulse 99   Temp 97.6 °F (36.4 °C)   Ht 182.9 cm (72\")   Wt 87.1 kg (192 lb 2 oz)   SpO2 99%   BMI 26.06 kg/m²     Body mass index is 26.06 kg/m².    Review of Systems   Constitutional: Negative for chills, fatigue and fever.   HENT: Positive for postnasal drip and rhinorrhea. Negative for congestion, sinus pressure, sneezing, sore throat and trouble swallowing.    Respiratory: Positive for cough and wheezing. Negative for shortness of breath.    Cardiovascular: Negative for chest pain.   Gastrointestinal: Negative for abdominal pain, diarrhea, nausea and vomiting.   Genitourinary: Negative for dysuria and frequency.   Musculoskeletal: Negative for back pain.   Neurological: Negative for headache.       Past History:  Medical History: has a past medical history of Diabetes mellitus type 1 (HCC).   Surgical History: has a past surgical history that includes Knee surgery (Right, 07/25/2022).   Family History: family history includes Diabetes type I in his mother.   Social History: reports that " he has never smoked. His smokeless tobacco use includes chew. He reports that he does not currently use alcohol. He reports that he does not use drugs.    PHQ-2 Depression Screening  Little interest or pleasure in doing things? 0-->not at all   Feeling down, depressed, or hopeless? 0-->not at all   PHQ-2 Total Score 0        PHQ-9 Depression Screening  Little interest or pleasure in doing things? 0-->not at all   Feeling down, depressed, or hopeless? 0-->not at all   Trouble falling or staying asleep, or sleeping too much?     Feeling tired or having little energy?     Poor appetite or overeating?     Feeling bad about yourself - or that you are a failure or have let yourself or your family down?     Trouble concentrating on things, such as reading the newspaper or watching television?     Moving or speaking so slowly that other people could have noticed? Or the opposite - being so fidgety or restless that you have been moving around a lot more than usual?     Thoughts that you would be better off dead, or of hurting yourself in some way?     PHQ-9 Total Score 0   If you checked off any problems, how difficult have these problems made it for you to do your work, take care of things at home, or get along with other people?       PHQ-9 Total Score: 0      Patient screened positive for depression based on a PHQ-9 score of 0 on 3/23/2023. Follow-up recommendations include:          Current Outpatient Medications:   •  Continuous Blood Gluc Sensor (Dexcom G6 Sensor), , Disp: , Rfl:   •  Continuous Blood Gluc Transmit (Dexcom G6 Transmitter) misc, , Disp: , Rfl:   •  fluticasone (FLONASE) 50 MCG/ACT nasal spray, , Disp: , Rfl:   •  HumaLOG 100 UNIT/ML injection, INJECT 4-18 UNITS 4 TIMES DAILY WITH INSULIN PUMP AS DIRECTED, Disp: , Rfl:   •  levocetirizine (XYZAL) 5 MG tablet, Take 1 tablet by mouth Daily., Disp: 90 tablet, Rfl: 3  •  albuterol sulfate  (90 Base) MCG/ACT inhaler, Inhale 2 puffs Every 4 (Four) Hours  As Needed for Wheezing., Disp: 18 g, Rfl: 0  •  cefdinir (OMNICEF) 300 MG capsule, Take 1 capsule by mouth 2 (Two) Times a Day., Disp: 20 capsule, Rfl: 0   (Not in a hospital admission)     Allergies: Penicillins, Citrullus vulgaris, Nuts, Levofloxacin, and Nickel    Physical Exam  Constitutional:       Appearance: Normal appearance.   HENT:      Right Ear: Tympanic membrane, ear canal and external ear normal.      Left Ear: Tympanic membrane, ear canal and external ear normal.      Nose: Nose normal.      Mouth/Throat:      Mouth: Mucous membranes are moist.      Pharynx: Oropharynx is clear.   Eyes:      Conjunctiva/sclera: Conjunctivae normal.      Pupils: Pupils are equal, round, and reactive to light.   Cardiovascular:      Rate and Rhythm: Normal rate and regular rhythm.      Heart sounds: Normal heart sounds.   Pulmonary:      Effort: Pulmonary effort is normal. No respiratory distress.      Breath sounds: Normal breath sounds. No wheezing, rhonchi or rales.   Neurological:      General: No focal deficit present.      Mental Status: He is alert and oriented to person, place, and time. Mental status is at baseline.   Psychiatric:         Mood and Affect: Mood normal.         Behavior: Behavior normal.         Thought Content: Thought content normal.         Judgment: Judgment normal.          Result Review :                   Assessment and Plan    Diagnoses and all orders for this visit:    1. Acute cough (Primary)  Assessment & Plan:  Chest x-ray completed.  Will let know if radiologist sees anything.  Antibiotic as prescribed.  Encouraged him to continue with his Xyzal and to start using his Flonase more regularly.  Mucinex DM as needed cough congestion.  Albuterol inhaler as needed wheezing.  Risk of meds discussed and understood.  Education provided.  Return in 2 days if no improvement, sooner if worsens.  Return to clinic or ED with any issues or concerns.    Orders:  -     XR Chest PA & Lateral (In  Office)  -     cefdinir (OMNICEF) 300 MG capsule; Take 1 capsule by mouth 2 (Two) Times a Day.  Dispense: 20 capsule; Refill: 0  -     albuterol sulfate  (90 Base) MCG/ACT inhaler; Inhale 2 puffs Every 4 (Four) Hours As Needed for Wheezing.  Dispense: 18 g; Refill: 0            BMI is >= 25 and <30. (Overweight) The following options were offered after discussion;: exercise counseling/recommendations and nutrition counseling/recommendations       Follow Up   Return if symptoms worsen or fail to improve.  Patient was given instructions and counseling regarding his condition or for health maintenance advice. Please see specific information pulled into the AVS if appropriate.     CIARA Richey

## 2023-03-23 NOTE — ASSESSMENT & PLAN NOTE
Chest x-ray completed.  Will let know if radiologist sees anything.  Antibiotic as prescribed.  Encouraged him to continue with his Xyzal and to start using his Flonase more regularly.  Mucinex DM as needed cough congestion.  Albuterol inhaler as needed wheezing.  Risk of meds discussed and understood.  Education provided.  Return in 2 days if no improvement, sooner if worsens.  Return to clinic or ED with any issues or concerns.

## 2023-03-28 ENCOUNTER — TELEPHONE (OUTPATIENT)
Dept: FAMILY MEDICINE CLINIC | Facility: CLINIC | Age: 28
End: 2023-03-28

## 2023-03-29 NOTE — TELEPHONE ENCOUNTER
"HUB TO READ:    \"Please let patient know the radiologist states chest x-ray was unremarkable.  Thanks\"    Attempted x 2  "

## 2023-04-12 ENCOUNTER — OFFICE VISIT (OUTPATIENT)
Dept: FAMILY MEDICINE CLINIC | Facility: CLINIC | Age: 28
End: 2023-04-12
Payer: MEDICAID

## 2023-04-12 VITALS
OXYGEN SATURATION: 98 % | HEIGHT: 72 IN | DIASTOLIC BLOOD PRESSURE: 100 MMHG | BODY MASS INDEX: 26.55 KG/M2 | SYSTOLIC BLOOD PRESSURE: 140 MMHG | HEART RATE: 118 BPM | WEIGHT: 196 LBS

## 2023-04-12 DIAGNOSIS — E10.65 TYPE 1 DIABETES MELLITUS WITH HYPERGLYCEMIA: ICD-10-CM

## 2023-04-12 DIAGNOSIS — E56.9 VITAMIN DEFICIENCY: ICD-10-CM

## 2023-04-12 DIAGNOSIS — E78.2 MIXED HYPERLIPIDEMIA: ICD-10-CM

## 2023-04-12 DIAGNOSIS — J30.1 SEASONAL ALLERGIC RHINITIS DUE TO POLLEN: Primary | ICD-10-CM

## 2023-04-12 PROBLEM — R05.9 COUGH: Status: RESOLVED | Noted: 2023-03-23 | Resolved: 2023-04-12

## 2023-04-12 PROCEDURE — 1159F MED LIST DOCD IN RCRD: CPT | Performed by: NURSE PRACTITIONER

## 2023-04-12 PROCEDURE — 1160F RVW MEDS BY RX/DR IN RCRD: CPT | Performed by: NURSE PRACTITIONER

## 2023-04-12 PROCEDURE — 3080F DIAST BP >= 90 MM HG: CPT | Performed by: NURSE PRACTITIONER

## 2023-04-12 PROCEDURE — 99214 OFFICE O/P EST MOD 30 MIN: CPT | Performed by: NURSE PRACTITIONER

## 2023-04-12 PROCEDURE — 3077F SYST BP >= 140 MM HG: CPT | Performed by: NURSE PRACTITIONER

## 2023-04-12 NOTE — PROGRESS NOTES
"Chief Complaint  Diabetes    Subjective          Parth Bright presents to [facility]  History of Present Illness  Pt is here to follow up on allergies, anxiety, and type 1 DM. He went to [facility] a few months ago for alcoholism and has not had alcohol since then. He states his glucose has been elevated at times. He takes Xyzal for allergies.       Objective   Vital Signs:   /100   Pulse 118   Ht 182.9 cm (72\")   Wt 88.9 kg (196 lb)   SpO2 98%   BMI 26.58 kg/m²     Body mass index is 26.58 kg/m².    Review of Systems   Constitutional: Negative for fatigue and fever.   Respiratory: Negative for shortness of breath.    Cardiovascular: Negative for chest pain, palpitations and leg swelling.   Neurological: Negative for syncope.   Psychiatric/Behavioral: The patient is not nervous/anxious.           Current Outpatient Medications:   •  albuterol sulfate  (90 Base) MCG/ACT inhaler, Inhale 2 puffs Every 4 (Four) Hours As Needed for Wheezing., Disp: 18 g, Rfl: 0  •  Continuous Blood Gluc Sensor (Dexcom G6 Sensor), , Disp: , Rfl:   •  Continuous Blood Gluc Transmit (Dexcom G6 Transmitter) misc, , Disp: , Rfl:   •  fluticasone (FLONASE) 50 MCG/ACT nasal spray, , Disp: , Rfl:   •  HumaLOG 100 UNIT/ML injection, INJECT 4-18 UNITS 4 TIMES DAILY WITH INSULIN PUMP AS DIRECTED, Disp: , Rfl:   •  levocetirizine (XYZAL) 5 MG tablet, Take 1 tablet by mouth Daily., Disp: 90 tablet, Rfl: 3      Allergies: Penicillins and Levofloxacin    Physical Exam  Constitutional:       Appearance: Normal appearance.   HENT:      Head: Normocephalic.   Eyes:      Conjunctiva/sclera: Conjunctivae normal.      Pupils: Pupils are equal, round, and reactive to light.   Cardiovascular:      Rate and Rhythm: Normal rate and regular rhythm.      Heart sounds: Normal heart sounds.   Pulmonary:      Effort: Pulmonary effort is normal.      Breath sounds: Normal breath sounds.   Abdominal:      Tenderness: " There is no abdominal tenderness.   Musculoskeletal:         General: Normal range of motion.   Skin:     General: Skin is warm and dry.      Capillary Refill: Capillary refill takes less than 2 seconds.   Neurological:      General: No focal deficit present.      Mental Status: He is alert and oriented to person, place, and time.   Psychiatric:         Mood and Affect: Mood normal.         Behavior: Behavior normal.         Thought Content: Thought content normal.         Judgment: Judgment normal.          Result Review :                   Assessment and Plan    Diagnoses and all orders for this visit:    1. Seasonal allergic rhinitis due to pollen (Primary)  Comments:  Continue Xyzal, Flonase, and Albuterol as needed.    2. Type 1 diabetes mellitus with hyperglycemia  Comments:  Continue insulin and follow up with endocrinology. We discussed diet and exercise. Return for fasting labs.  Orders:  -     Hemoglobin A1c; Future    3. Vitamin deficiency  -     Vitamin B12; Future  -     Vitamin D,25-Hydroxy; Future  -     Folate; Future    4. Mixed hyperlipidemia  -     CBC & Differential; Future  -     Comprehensive Metabolic Panel; Future  -     Lipid Panel; Future                Follow Up   Return in about 3 months (around 7/12/2023) for Recheck.  Patient was given instructions and counseling regarding his condition or for health maintenance advice. Please see specific information pulled into the AVS if appropriate.     CIARA Chun

## 2023-04-20 ENCOUNTER — LAB (OUTPATIENT)
Dept: FAMILY MEDICINE CLINIC | Facility: CLINIC | Age: 28
End: 2023-04-20
Payer: MEDICAID

## 2023-04-20 DIAGNOSIS — E78.2 MIXED HYPERLIPIDEMIA: ICD-10-CM

## 2023-04-20 DIAGNOSIS — E10.65 TYPE 1 DIABETES MELLITUS WITH HYPERGLYCEMIA: ICD-10-CM

## 2023-04-20 DIAGNOSIS — E56.9 VITAMIN DEFICIENCY: ICD-10-CM

## 2023-04-20 PROCEDURE — 36415 COLL VENOUS BLD VENIPUNCTURE: CPT | Performed by: NURSE PRACTITIONER

## 2023-04-21 LAB
25(OH)D3+25(OH)D2 SERPL-MCNC: 23 NG/ML (ref 30–100)
ALBUMIN SERPL-MCNC: 4.2 G/DL (ref 4.1–5.2)
ALBUMIN/GLOB SERPL: 2.5 {RATIO} (ref 1.2–2.2)
ALP SERPL-CCNC: 73 IU/L (ref 44–121)
ALT SERPL-CCNC: 104 IU/L (ref 0–44)
AST SERPL-CCNC: 120 IU/L (ref 0–40)
BASOPHILS # BLD AUTO: 0 X10E3/UL (ref 0–0.2)
BASOPHILS NFR BLD AUTO: 1 %
BILIRUB SERPL-MCNC: <0.2 MG/DL (ref 0–1.2)
BUN SERPL-MCNC: 10 MG/DL (ref 6–20)
BUN/CREAT SERPL: 17 (ref 9–20)
CALCIUM SERPL-MCNC: 9.3 MG/DL (ref 8.7–10.2)
CHLORIDE SERPL-SCNC: 100 MMOL/L (ref 96–106)
CHOLEST SERPL-MCNC: 245 MG/DL (ref 100–199)
CO2 SERPL-SCNC: 32 MMOL/L (ref 20–29)
CREAT SERPL-MCNC: 0.58 MG/DL (ref 0.76–1.27)
EGFRCR SERPLBLD CKD-EPI 2021: 136 ML/MIN/1.73
EOSINOPHIL # BLD AUTO: 0.2 X10E3/UL (ref 0–0.4)
EOSINOPHIL NFR BLD AUTO: 5 %
ERYTHROCYTE [DISTWIDTH] IN BLOOD BY AUTOMATED COUNT: 14.1 % (ref 11.6–15.4)
FOLATE SERPL-MCNC: 10 NG/ML
GLOBULIN SER CALC-MCNC: 1.7 G/DL (ref 1.5–4.5)
GLUCOSE SERPL-MCNC: 104 MG/DL (ref 70–99)
HBA1C MFR BLD: 11 % (ref 4.8–5.6)
HCT VFR BLD AUTO: 44 % (ref 37.5–51)
HDLC SERPL-MCNC: 73 MG/DL
HGB BLD-MCNC: 14.7 G/DL (ref 13–17.7)
IMM GRANULOCYTES # BLD AUTO: 0 X10E3/UL (ref 0–0.1)
IMM GRANULOCYTES NFR BLD AUTO: 1 %
LDLC SERPL CALC-MCNC: 160 MG/DL (ref 0–99)
LYMPHOCYTES # BLD AUTO: 1.6 X10E3/UL (ref 0.7–3.1)
LYMPHOCYTES NFR BLD AUTO: 46 %
MCH RBC QN AUTO: 30.4 PG (ref 26.6–33)
MCHC RBC AUTO-ENTMCNC: 33.4 G/DL (ref 31.5–35.7)
MCV RBC AUTO: 91 FL (ref 79–97)
MONOCYTES # BLD AUTO: 0.3 X10E3/UL (ref 0.1–0.9)
MONOCYTES NFR BLD AUTO: 8 %
NEUTROPHILS # BLD AUTO: 1.3 X10E3/UL (ref 1.4–7)
NEUTROPHILS NFR BLD AUTO: 39 %
PLATELET # BLD AUTO: 142 X10E3/UL (ref 150–450)
POTASSIUM SERPL-SCNC: 3.4 MMOL/L (ref 3.5–5.2)
PROT SERPL-MCNC: 5.9 G/DL (ref 6–8.5)
RBC # BLD AUTO: 4.83 X10E6/UL (ref 4.14–5.8)
SODIUM SERPL-SCNC: 144 MMOL/L (ref 134–144)
TRIGL SERPL-MCNC: 72 MG/DL (ref 0–149)
VIT B12 SERPL-MCNC: 567 PG/ML (ref 232–1245)
VLDLC SERPL CALC-MCNC: 12 MG/DL (ref 5–40)
WBC # BLD AUTO: 3.3 X10E3/UL (ref 3.4–10.8)

## 2023-04-24 RX ORDER — ROSUVASTATIN CALCIUM 10 MG/1
10 TABLET, COATED ORAL DAILY
Qty: 90 TABLET | Refills: 1 | Status: SHIPPED | OUTPATIENT
Start: 2023-04-24

## 2023-05-08 ENCOUNTER — TELEPHONE (OUTPATIENT)
Dept: FAMILY MEDICINE CLINIC | Facility: CLINIC | Age: 28
End: 2023-05-08
Payer: MEDICAID

## 2023-05-08 NOTE — TELEPHONE ENCOUNTER
Caller: [facility] [address] [place] [address] - [phone] [facility] [phone] FX    Relationship: Pharmacy    Best call back number: [phone]    What is the best time to reach you: ANYTIME    Who are you requesting to speak with (clinical staff, provider,  specific staff member): CLINICAL    Do you know the name of the person who called: CHONG    What was the call regarding: AUDIT FOR PATIENT PRESCRIPTION HEMALOG WRITTEN ON [id number]    Do you require a callback: PLEASE ADVISE

## 2023-05-10 NOTE — TELEPHONE ENCOUNTER
[facility] contacted. They faxed a form that needs to be filled out on his medication from last year. Let her know crystal is out until Wednesday but we will do and fax back, she said that was fine.

## 2023-05-22 ENCOUNTER — TELEPHONE (OUTPATIENT)
Dept: FAMILY MEDICINE CLINIC | Facility: CLINIC | Age: 28
End: 2023-05-22
Payer: MEDICAID

## 2023-05-22 NOTE — TELEPHONE ENCOUNTER
CHONG IS CALLING FOR THE STATUS OF THIS AUDIT.      [id number]  Caller: [facility] [address] [place] [address] [phone] [facility] [phone] FX     Relationship: Pharmacy     Best call back number: [phone]     What is the best time to reach you: ANYTIME     Who are you requesting to speak with (clinical staff, provider,  specific staff member): CLINICAL     Do you know the name of the person who called: CHONG     What was the call regarding: AUDIT FOR PATIENT PRESCRIPTION HEMALOG WRITTEN ON [id number]     Do you require a callback: PLEASE ADVISE             GL    1:48 PM  Mynor Olivera RegSched Rep routed this conversation to [facility]    May 10, 2023  Fatou Oglesby MA          9:42 AM  Note    [facility] contacted. They faxed a form that needs to be filled out on his medication from last year. Let her know crystal is out until Wednesday but we will do and fax back, she said that was fine.

## 2023-05-23 NOTE — TELEPHONE ENCOUNTER
Hub to read   We just received it yesterday afternoon, josep is out on  today. As soon as she signs it we will fax back to them thanks/.

## 2023-05-24 ENCOUNTER — TELEPHONE (OUTPATIENT)
Dept: FAMILY MEDICINE CLINIC | Facility: CLINIC | Age: 28
End: 2023-05-24
Payer: MEDICAID

## 2023-05-24 NOTE — TELEPHONE ENCOUNTER
Pharmacy called - said that they had faxed over paperwork that is required by them for an annual audit of Pt's insulin. This paperwork needs to be sent back today. Please fax back as soon as possible.

## 2023-05-25 ENCOUNTER — TELEPHONE (OUTPATIENT)
Dept: FAMILY MEDICINE CLINIC | Facility: CLINIC | Age: 28
End: 2023-05-25

## 2023-05-25 NOTE — TELEPHONE ENCOUNTER
[facility] called - said that rec'd paperwork relating to Pt's prescription and their audit - For the humalog, they need to have a max daily dose listed. They would like us to add that and resend paperwork as soon as possible

## 2023-05-25 NOTE — TELEPHONE ENCOUNTER
The fax received has handwritten information cut off so we can see what it says is needed. I see humalog but the written date is cut off. Also information at the bottom of the page is cut off. Left message asking [facility] to refax it or call me back

## 2023-07-26 ENCOUNTER — TELEPHONE (OUTPATIENT)
Dept: FAMILY MEDICINE CLINIC | Facility: CLINIC | Age: 28
End: 2023-07-26
Payer: MEDICAID

## 2023-07-26 NOTE — TELEPHONE ENCOUNTER
Will you let pt's mom know that I printed letter for an insulin pump for the [facility]. Thanks!

## 2023-07-27 ENCOUNTER — TELEPHONE (OUTPATIENT)
Dept: FAMILY MEDICINE CLINIC | Facility: CLINIC | Age: 28
End: 2023-07-27
Payer: MEDICAID

## 2023-10-19 RX ORDER — INSULIN LISPRO 100 [IU]/ML
INJECTION, SOLUTION INTRAVENOUS; SUBCUTANEOUS
Qty: 10 ML | Refills: 5 | Status: SHIPPED | OUTPATIENT
Start: 2023-10-19

## 2024-01-25 RX ORDER — INSULIN LISPRO 100 [IU]/ML
INJECTION, SOLUTION INTRAVENOUS; SUBCUTANEOUS
Qty: 20 ML | Refills: 0 | Status: SHIPPED | OUTPATIENT
Start: 2024-01-25

## 2024-02-19 RX ORDER — INSULIN LISPRO 100 [IU]/ML
INJECTION, SOLUTION INTRAVENOUS; SUBCUTANEOUS
Qty: 20 ML | Refills: 0 | Status: SHIPPED | OUTPATIENT
Start: 2024-02-19

## 2024-03-05 ENCOUNTER — TELEPHONE (OUTPATIENT)
Dept: FAMILY MEDICINE CLINIC | Facility: CLINIC | Age: 29
End: 2024-03-05

## 2024-03-05 NOTE — TELEPHONE ENCOUNTER
Caller: [facility]    Relationship: Other    Best call back number: [phone]    What was the call regarding: FOLLOWING UP ON REQUEST FOR PROGRESS NOTES. A FAX WAS SENT IN FEBRUARY AND THEY WILL FAX AGAIN TODAY

## 2024-03-06 NOTE — TELEPHONE ENCOUNTER
In media tab but not signed. Pt hasn't been seen in a while and would need an appt. Pt would need a new pcp due to old one being josep sherman.

## 2024-03-08 ENCOUNTER — TELEPHONE (OUTPATIENT)
Dept: FAMILY MEDICINE CLINIC | Facility: CLINIC | Age: 29
End: 2024-03-08

## 2024-03-08 NOTE — TELEPHONE ENCOUNTER
Pt is due for an appt for [facility]. Can we get him scheduled with someone? Thanks!

## 2024-03-09 NOTE — TELEPHONE ENCOUNTER
"HUB TO RELAY: \"Pt still needs to pick a new provider and be placed on the schedule for a med recheck.\"       Reach out to pt, they picked the phone up and hung up the phone.  "

## 2024-03-20 RX ORDER — INSULIN LISPRO 100 [IU]/ML
INJECTION, SOLUTION INTRAVENOUS; SUBCUTANEOUS
Qty: 20 ML | Refills: 0 | Status: SHIPPED | OUTPATIENT
Start: 2024-03-20

## 2024-05-06 RX ORDER — INSULIN LISPRO 100 [IU]/ML
INJECTION, SOLUTION INTRAVENOUS; SUBCUTANEOUS
Qty: 20 ML | Refills: 0 | Status: SHIPPED | OUTPATIENT
Start: 2024-05-06

## 2024-06-07 ENCOUNTER — OFFICE VISIT (OUTPATIENT)
Dept: FAMILY MEDICINE CLINIC | Facility: CLINIC | Age: 29
End: 2024-06-07
Payer: MEDICAID

## 2024-06-07 VITALS
BODY MASS INDEX: 27.15 KG/M2 | HEIGHT: 72 IN | OXYGEN SATURATION: 98 % | WEIGHT: 200.44 LBS | SYSTOLIC BLOOD PRESSURE: 130 MMHG | DIASTOLIC BLOOD PRESSURE: 80 MMHG | HEART RATE: 93 BPM

## 2024-06-07 DIAGNOSIS — F41.9 ANXIETY: ICD-10-CM

## 2024-06-07 DIAGNOSIS — J30.2 SEASONAL ALLERGIC RHINITIS, UNSPECIFIED TRIGGER: ICD-10-CM

## 2024-06-07 DIAGNOSIS — R82.90 NONSPECIFIC FINDING ON EXAMINATION OF URINE: ICD-10-CM

## 2024-06-07 DIAGNOSIS — F32.A DEPRESSION, UNSPECIFIED DEPRESSION TYPE: ICD-10-CM

## 2024-06-07 DIAGNOSIS — I10 PRIMARY HYPERTENSION: ICD-10-CM

## 2024-06-07 DIAGNOSIS — E10.9 TYPE 1 DIABETES MELLITUS WITHOUT COMPLICATION: ICD-10-CM

## 2024-06-07 DIAGNOSIS — F10.11 HISTORY OF ALCOHOL ABUSE: ICD-10-CM

## 2024-06-07 DIAGNOSIS — E78.2 MIXED HYPERLIPIDEMIA: ICD-10-CM

## 2024-06-07 DIAGNOSIS — E55.9 VITAMIN D DEFICIENCY: ICD-10-CM

## 2024-06-07 DIAGNOSIS — Z11.59 NEED FOR HEPATITIS C SCREENING TEST: ICD-10-CM

## 2024-06-07 DIAGNOSIS — Z00.00 ANNUAL PHYSICAL EXAM: Primary | ICD-10-CM

## 2024-06-07 LAB
BILIRUB BLD-MCNC: NEGATIVE MG/DL
CLARITY, POC: CLEAR
COLOR UR: YELLOW
EXPIRATION DATE: ABNORMAL
EXPIRATION DATE: NORMAL
GLUCOSE UR STRIP-MCNC: ABNORMAL MG/DL
KETONES UR QL: NEGATIVE
LEUKOCYTE EST, POC: NEGATIVE
Lab: ABNORMAL
Lab: NORMAL
NITRITE UR-MCNC: NEGATIVE MG/ML
PH UR: 6.5 [PH] (ref 5–8)
POC CREATININE URINE: 100
POC MICROALBUMIN URINE: 150
PROT UR STRIP-MCNC: ABNORMAL MG/DL
RBC # UR STRIP: ABNORMAL /UL
SP GR UR: 1.01 (ref 1–1.03)
UROBILINOGEN UR QL: NORMAL

## 2024-06-07 RX ORDER — INSULIN LISPRO 100 [IU]/ML
INJECTION, SOLUTION INTRAVENOUS; SUBCUTANEOUS
Qty: 20 ML | Refills: 3 | Status: SHIPPED | OUTPATIENT
Start: 2024-06-07

## 2024-06-07 RX ORDER — MONTELUKAST SODIUM 10 MG/1
10 TABLET ORAL NIGHTLY
Qty: 30 TABLET | Refills: 3 | Status: SHIPPED | OUTPATIENT
Start: 2024-06-07

## 2024-06-07 NOTE — ASSESSMENT & PLAN NOTE
Labs drawn.  UA completed.  Culture sent.  Call in 2 days for results.  Return in 1 to 2 weeks for repeat UA to make sure clears.  Micro completed.  Goal blood pressure less than 140/90.  Let me know if gets 2 or above that.  PHQ-9 completed and discussed.  No thoughts of suicide or hurting himself or anyone else.  Proper diet and exercise plan discussed and encouraged.  Check feet daily.  Annual dental and eye exams encouraged.  Will place endocrinology referral for further evaluation of his diabetes.  We discussed starting a statin and he wants to wait and see lab results first.  Informed him diabetics are recommended to be on a statin but again will wait to see lab results.  Risk discussed and understood.  Patient denies tetanus vaccine.  Encouraged him to discuss tetanus COVID pneumonia vaccines with his pharmacist.  Encouraged to stop vaping.  Education provided.  Return to clinic or ED with any issues or concerns.

## 2024-06-07 NOTE — PATIENT INSTRUCTIONS
Obesity, Adult  Obesity is the condition of having too much total body fat. Being overweight or obese means that your weight is greater than what is considered healthy for your body size. Obesity is determined by a measurement called BMI (body mass index). BMI is an estimate of body fat and is calculated from height and weight. For adults, a BMI of 30 or higher is considered obese.  Obesity can lead to other health concerns and major illnesses, including:  Stroke.  Coronary artery disease (CAD).  Type 2 diabetes.  Some types of cancer, including cancers of the colon, breast, uterus, and gallbladder.  High blood pressure (hypertension).  High cholesterol.  Gallbladder stones.  Obesity can also contribute to:  Osteoarthritis.  Sleep apnea.  Infertility problems.  What are the causes?  Common causes of this condition include:  Eating daily meals that are high in calories, sugar, and fat.  Drinking high amounts of sugar-sweetened beverages, such as soft drinks.  Being born with genes that may make you more likely to become obese.  Having a medical condition that causes obesity, including:  Hypothyroidism.  Polycystic ovarian syndrome (PCOS).  Binge-eating disorder.  Cushing syndrome.  Taking certain medicines, such as steroids, antidepressants, and seizure medicines.  Not being physically active (sedentary lifestyle).  Not getting enough sleep.  What increases the risk?  The following factors may make you more likely to develop this condition:  Having a family history of obesity.  Living in an area with limited access to:  [place], recreation centers, or sidewalks.  Healthy food choices, such as grocery stores and [employer] markets.  What are the signs or symptoms?  The main sign of this condition is having too much body fat.  How is this diagnosed?  This condition is diagnosed based on:  Your BMI. If you are an adult with a BMI of 30 or higher, you are considered obese.  Your waist circumference. This measures the  distance around your waistline.  Your skinfold thickness. Your health care provider may gently pinch a fold of your skin and measure it.  You may have other tests to check for underlying conditions.  How is this treated?  Treatment for this condition often includes changing your lifestyle. Treatment may include some or all of the following:  Dietary changes. This may include developing a healthy meal plan.  Regular physical activity. This may include activity that causes your heart to beat faster (aerobic exercise) and strength training. Work with your health care provider to design an exercise program that works for you.  Medicine to help you lose weight if you are unable to lose one pound a week after six weeks of healthy eating and more physical activity.  Treating conditions that cause the obesity (underlying conditions).  Surgery. Surgical options may include gastric banding and gastric bypass. Surgery may be done if:  Other treatments have not helped to improve your condition.  You have a BMI of 40 or higher.  You have life-threatening health problems related to obesity.  Follow these instructions at home:  Eating and drinking    Follow recommendations from your health care provider about what you eat and drink. Your health care provider may advise you to:  Limit fast food, sweets, and processed snack foods.  Choose low-fat options, such as low-fat milk instead of whole milk.  Eat five or more servings of fruits or vegetables every day.  Choose healthy foods when you eat out.  Keep low-fat snacks available.  Limit sugary drinks, such as soda, fruit juice, sweetened iced tea, and flavored milk.  Drink enough water to keep your urine pale yellow.  Do not follow a fad diet. Fad diets can be unhealthy and even dangerous.  Other healthful choices include:  Eat at home more often. This gives you more control over what you eat.  Learn to read food labels. This will help you understand how much food is considered one  serving.  Learn what a healthy serving size is.  Physical activity  Exercise regularly, as told by your health care provider.  Most adults should get up to 150 minutes of moderate-intensity exercise every week.  Ask your health care provider what types of exercise are safe for you and how often you should exercise.  Warm up and stretch before being active.  Cool down and stretch after being active.  Rest between periods of activity.  Lifestyle  Work with your health care provider and a dietitian to set a weight-loss goal that is healthy and reasonable for you.  Limit your screen time.  Find ways to reward yourself that do not involve food.  Do not drink alcohol if:  Your health care provider tells you not to drink.  You are pregnant, may be pregnant, or are planning to become pregnant.  If you drink alcohol:  Limit how much you have to:  0-1 drink a day for women.  0-2 drinks a day for men.  Know how much alcohol is in your drink. In the [place] one drink equals one 12 oz bottle of beer (355 mL), one 5 oz glass of wine (148 mL), or one 1½ oz glass of hard liquor (44 mL).  General instructions  Keep a weight-loss journal to keep track of the food you eat and how much exercise you get.  Take over-the-counter and prescription medicines only as told by your health care provider.  Take vitamins and supplements only as told by your health care provider.  Consider joining a support group. Your health care provider may be able to recommend a support group.  Pay attention to your mental health as obesity can lead to depression or self esteem issues.  Keep all follow-up visits. This is important.  Contact a health care provider if:  You are unable to meet your weight-loss goal after six weeks of dietary and lifestyle changes.  You have trouble breathing.  Summary  Obesity is the condition of having too much total body fat.  Being overweight or obese means that your weight is greater than what is considered healthy for your body  size.  Work with your health care provider and a dietitian to set a weight-loss goal that is healthy and reasonable for you.  Exercise regularly, as told by your health care provider. Ask your health care provider what types of exercise are safe for you and how often you should exercise.  This information is not intended to replace advice given to you by your health care provider. Make sure you discuss any questions you have with your health care provider.  Document Revised: 07/26/2022 Document Reviewed: 07/26/2022  [employer] Patient Education © 2024 [employer]    Exercising to Lose Weight  Getting regular exercise is important for everyone. It is especially important if you are overweight. Being overweight increases your risk of heart disease, stroke, diabetes, high blood pressure, and several types of cancer. Exercising, and reducing the calories you consume, can help you lose weight and improve fitness and health.  Exercise can be moderate or vigorous intensity. To lose weight, most people need to do a certain amount of moderate or vigorous-intensity exercise each week.  How can exercise affect me?  You lose weight when you exercise enough to burn more calories than you eat. Exercise also reduces body fat and builds muscle. The more muscle you have, the more calories you burn. Exercise also:  Improves mood.  Reduces stress and tension.  Improves your overall fitness, flexibility, and endurance.  Increases bone strength.  Moderate-intensity exercise    Moderate-intensity exercise is any activity that gets you moving enough to burn at least three times more energy (calories) than if you were sitting.  Examples of moderate exercise include:  Walking a mile in 15 minutes.  Doing light yard work.  Biking at an easy pace.  Most people should get at least 150 minutes of moderate-intensity exercise a week to maintain their body weight.  Vigorous-intensity exercise  Vigorous-intensity exercise is any activity that gets  you moving enough to burn at least six times more calories than if you were sitting. When you exercise at this intensity, you should be working hard enough that you are not able to carry on a conversation.  Examples of vigorous exercise include:  Running.  Playing a team sport, such as football, basketball, and soccer.  Jumping rope.  Most people should get at least 75 minutes a week of vigorous exercise to maintain their body weight.  What actions can I take to lose weight?  The amount of exercise you need to lose weight depends on:  Your age.  The type of exercise.  Any health conditions you have.  Your overall physical ability.  Talk to your health care provider about how much exercise you need and what types of activities are safe for you.  Nutrition    Make changes to your diet as told by your health care provider or diet and nutrition specialist (dietitian). This may include:  Eating fewer calories.  Eating more protein.  Eating less unhealthy fats.  Eating a diet that includes fresh fruits and vegetables, whole grains, low-fat dairy products, and lean protein.  Avoiding foods with added fat, salt, and sugar.  Drink plenty of water while you exercise to prevent dehydration or heat stroke.  Activity  Choose an activity that you enjoy and set realistic goals. Your health care provider can help you make an exercise plan that works for you.  Exercise at a moderate or vigorous intensity most days of the week.  The intensity of exercise may vary from person to person. You can tell how intense a workout is for you by paying attention to your breathing and heartbeat. Most people will notice their breathing and heartbeat get faster with more intense exercise.  Do resistance training twice each week, such as:  Push-ups.  Sit-ups.  Lifting weights.  Using resistance bands.  Getting short amounts of exercise can be just as helpful as long, structured periods of exercise. If you have trouble finding time to exercise, try  doing these things as part of your daily routine:  Get up, stretch, and walk around every 30 minutes throughout the day.  Go for a walk during your lunch break.  Park your car farther away from your destination.  If you take public transportation, get off one stop early and walk the rest of the way.  Make phone calls while standing up and walking around.  Take the stairs instead of elevators or escalators.  Wear comfortable clothes and shoes with good support.  Do not exercise so much that you hurt yourself, feel dizzy, or get very short of breath.  Where to find more information  [place] Department of Health and Human Services: www.hhs.gov  Centers for Disease Control and Prevention: www.cdc.gov  Contact a health care provider:  Before starting a new exercise program.  If you have questions or concerns about your weight.  If you have a medical problem that keeps you from exercising.  Get help right away if:  You have any of the following while exercising:  Injury.  Dizziness.  Difficulty breathing or shortness of breath that does not go away when you stop exercising.  Chest pain.  Rapid heartbeat.  These symptoms may represent a serious problem that is an emergency. Do not wait to see if the symptoms will go away. Get medical help right away. Call your local emergency services (911 in the [place] Do not drive yourself to the hospital.  Summary  Getting regular exercise is especially important if you are overweight.  Being overweight increases your risk of heart disease, stroke, diabetes, high blood pressure, and several types of cancer.  Losing weight happens when you burn more calories than you eat.  Reducing the amount of calories you eat, and getting regular moderate or vigorous exercise each week, helps you lose weight.  This information is not intended to replace advice given to you by your health care provider. Make sure you discuss any questions you have with your health care provider.  Document Revised:  02/13/2022 Document Reviewed: 02/13/2022  [employer] Patient Education © 2024 [employer]    MyPlate from [employer]    MyPlate is an outline of a general healthy diet based on the Dietary Guidelines for Americans, 1981-2145, from the [place] Department of Agriculture (USDA). It sets guidelines for how much food you should eat from each food group based on your age, sex, and level of physical activity.  What are tips for following MyPlate?  To follow MyPlate recommendations:  Eat a wide variety of fruits and vegetables, grains, and protein foods.  Serve smaller portions and eat less food throughout the day.  Limit portion sizes to avoid overeating.  Enjoy your food.  Get at least 150 minutes of exercise every week. This is about 30 minutes each day, 5 or more days per week.  It can be difficult to have every meal look like MyPlate. Think about MyPlate as eating guidelines for an entire day, rather than each individual meal.  Fruits and vegetables  Make one half of your plate fruits and vegetables.  Eat many different colors of fruits and vegetables each day.  For a 2,000-calorie daily food plan, eat:  2½ cups of vegetables every day.  2 cups of fruit every day.  1 cup is equal to:  1 cup raw or cooked vegetables.  1 cup raw fruit.  1 medium-sized orange, apple, or banana.  1 cup 100% fruit or vegetable juice.  2 cups raw leafy greens, such as lettuce, spinach, or kale.  ½ cup dried fruit.  Grains  One fourth of your plate should be grains.  Make at least half of the grains you eat each day whole grains.  For a 2,000-calorie daily food plan, eat 6 oz of grains every day.  1 oz is equal to:  1 slice bread.  1 cup cereal.  ½ cup cooked rice, cereal, or pasta.  Protein  One fourth of your plate should be protein.  Eat a wide variety of protein foods, including meat, poultry, fish, eggs, beans, nuts, and tofu.  For a 2,000-calorie daily food plan, eat 5½ oz of protein every day.  1 oz is equal to:  1 oz meat, poultry, or fish.  ¼  cup cooked beans.  1 egg.  ½ oz nuts or seeds.  1 Tbsp peanut butter.  Dairy  Drink fat-free or low-fat (1%) milk.  Eat or drink dairy as a side to meals.  For a 2,000-calorie daily food plan, eat or drink 3 cups of dairy every day.  1 cup is equal to:  1 cup milk, yogurt, cottage cheese, or soy milk (soy beverage).  2 oz processed cheese.  1½ oz natural cheese.  Fats, oils, salt, and sugars  Only small amounts of oils are recommended.  Avoid foods that are high in calories and low in nutritional value (empty calories), like foods high in fat or added sugars.  Choose foods that are low in salt (sodium). Choose foods that have less than 140 milligrams (mg) of sodium per serving.  Drink water instead of sugary drinks. Drink enough fluid to keep your urine pale yellow.  Where to find support  Work with your health care provider or a dietitian to develop a customized eating plan that is right for you.  Download an antonio (mobile application) to help you track your daily food intake.  Where to find more information  USDA: ChooseMyPlate.gov  Summary  MyPlate is a general guideline for healthy eating from the USDA. It is based on the Dietary Guidelines for Americans, 4148-5576.  In general, fruits and vegetables should take up one half of your plate, grains should take up one fourth of your plate, and protein should take up one fourth of your plate.  This information is not intended to replace advice given to you by your health care provider. Make sure you discuss any questions you have with your health care provider.  Document Revised: 11/08/2021 Document Reviewed: 11/08/2021  [employer] Patient Education © 2024 [employer]

## 2024-06-07 NOTE — PROGRESS NOTES
"Chief Complaint  Diabetes    Subjective          Parth Bright presents to [facility] for preventative yearly exam.   Diabetes  Pertinent negatives for diabetes include no polydipsia, no polyphagia and no polyuria.       Patient presents for annual exam.  He does vape.  No alcohol use.  States he actually just got out of the [employer] for about a year due to alcohol intake.  States he has been sober since going and doing well.  States he actually has a job there now.  States he does see a therapist as well.  History of type 1 diabetes.  He does have an insulin pump.  States his blood sugars are not well-controlled.  States on a good day he is happy to see 250.  States his insulin pump is programmed for basal rate plus mealtime insulin.  States it is been a while since he has seen endocrinology so would like referral to endocrinology as well.  He states he stopped his rosuvastatin at the [facility] and wants to have labs drawn before starting back on anything else and he understands the risks.  States feet are fine with no cuts or sores.  He knows he is due an eye exam and states he will schedule that.  No dizziness no headache no chest pain no chest pressure no shortness of breath no trouble breathing no urinary or bowel issues.    States he has a history of allergies.  States they seem to be flaring up.  States he has a lot of postnasal drainage and runny nose and sneezing.  Currently on Flonase and levocetirizine.  Is wondering if there is something we can add on.  No fever no chills no body aches.  States all drainage is clear.    Objective   Vital Signs:   /80   Pulse 93   Ht 182.9 cm (72\")   Wt 90.9 kg (200 lb 7 oz)   SpO2 98%   BMI 27.18 kg/m²     Body mass index is 27.18 kg/m².    Predictive Model Details   No score data available for Risk of Fall        PHQ-9 Depression Screening  Little interest or pleasure in doing things? 0-->not at all   Feeling down, " depressed, or hopeless? 0-->not at all   Trouble falling or staying asleep, or sleeping too much?     Feeling tired or having little energy?     Poor appetite or overeating?     Feeling bad about yourself - or that you are a failure or have let yourself or your family down?     Trouble concentrating on things, such as reading the newspaper or watching television?     Moving or speaking so slowly that other people could have noticed? Or the opposite - being so fidgety or restless that you have been moving around a lot more than usual?     Thoughts that you would be better off dead, or of hurting yourself in some way?     PHQ-9 Total Score 0   If you checked off any problems, how difficult have these problems made it for you to do your work, take care of things at home, or get along with other people?       Patient screened positive for depression based on a PHQ-9 score of 0 on 6/7/2024. Follow-up recommendations include:        Immunization History   Administered Date(s) Administered    Tdap 08/04/2008       Review of Systems   Constitutional: Negative.    HENT:  Positive for postnasal drip, rhinorrhea and sneezing. Negative for ear discharge, ear pain, sinus pressure, sore throat, swollen glands and trouble swallowing.    Eyes: Negative.    Respiratory: Negative.     Cardiovascular: Negative.    Gastrointestinal: Negative.    Endocrine: Negative for polydipsia, polyphagia and polyuria.   Genitourinary: Negative.    Musculoskeletal: Negative.    Skin: Negative.    Neurological: Negative.    Psychiatric/Behavioral: Negative.         Past History:  Medical History: has a past medical history of Diabetes mellitus type 1.   Surgical History: has a past surgical history that includes Knee surgery (Right, 07/25/2022).   Family History: family history includes Diabetes type I in his mother.   Social History: reports that he has never smoked. He has quit using smokeless tobacco.  His smokeless tobacco use included chew. He  reports that he does not currently use alcohol. He reports that he does not use drugs.      Current Outpatient Medications:     Continuous Blood Gluc Sensor (Dexcom G6 Sensor), , Disp: , Rfl:     Continuous Blood Gluc Transmit (Dexcom G6 Transmitter) misc, , Disp: , Rfl:     fluticasone (FLONASE) 50 MCG/ACT nasal spray, , Disp: , Rfl:     Insulin Lispro (humaLOG) 100 UNIT/ML injection, INJECT 4 TO 18 UNITS SUBCUTANEOUSLY 4 TIMES DAILY VIA INSULIN PUMP AS DIRECTED, Disp: 20 mL, Rfl: 3    levocetirizine (XYZAL) 5 MG tablet, Take 1 tablet by mouth Daily., Disp: 90 tablet, Rfl: 3    montelukast (Singulair) 10 MG tablet, Take 1 tablet by mouth Every Night., Disp: 30 tablet, Rfl: 3   Allergies: Penicillins and Levofloxacin    Physical Exam  Constitutional:       Appearance: Normal appearance.   HENT:      Head: Normocephalic.      Right Ear: Tympanic membrane, ear canal and external ear normal.      Left Ear: Tympanic membrane, ear canal and external ear normal.      Nose: Nose normal.      Mouth/Throat:      Mouth: Mucous membranes are moist.      Pharynx: Oropharynx is clear.   Eyes:      Extraocular Movements: Extraocular movements intact.      Conjunctiva/sclera: Conjunctivae normal.      Pupils: Pupils are equal, round, and reactive to light.   Cardiovascular:      Rate and Rhythm: Normal rate and regular rhythm.      Heart sounds: Normal heart sounds.   Pulmonary:      Effort: Pulmonary effort is normal.      Breath sounds: Normal breath sounds.   Abdominal:      General: Abdomen is flat. Bowel sounds are normal.      Palpations: Abdomen is soft.   Genitourinary:     Comments: Denied   Musculoskeletal:         General: Normal range of motion.      Cervical back: Normal range of motion.   Skin:     General: Skin is warm.      Findings: No erythema or rash.   Neurological:      General: No focal deficit present.      Mental Status: He is alert and oriented to person, place, and time. Mental status is at baseline.    Psychiatric:         Mood and Affect: Mood normal.         Behavior: Behavior normal.         Thought Content: Thought content normal.         Judgment: Judgment normal.          Result Review :                     Assessment and Plan    Diagnoses and all orders for this visit:    1. Annual physical exam (Primary)  Assessment & Plan:  Labs drawn.  UA completed.  Culture sent.  Call in 2 days for results.  Return in 1 to 2 weeks for repeat UA to make sure clears.  Micro completed.  Goal blood pressure less than 140/90.  Let me know if gets 2 or above that.  PHQ-9 completed and discussed.  No thoughts of suicide or hurting himself or anyone else.  Proper diet and exercise plan discussed and encouraged.  Check feet daily.  Annual dental and eye exams encouraged.  Will place endocrinology referral for further evaluation of his diabetes.  We discussed starting a statin and he wants to wait and see lab results first.  Informed him diabetics are recommended to be on a statin but again will wait to see lab results.  Risk discussed and understood.  Patient denies tetanus vaccine.  Encouraged him to discuss tetanus COVID pneumonia vaccines with his pharmacist.  Encouraged to stop vaping.  Education provided.  Return to clinic or ED with any issues or concerns.    Orders:  -     Cancel: CBC & Differential  -     Cancel: Comprehensive Metabolic Panel  -     Cancel: TSH Rfx On Abnormal To Free T4  -     POC Urinalysis Dipstick, Automated; Future  -     CBC & Differential  -     Comprehensive Metabolic Panel  -     TSH Rfx On Abnormal To Free T4  -     POC Urinalysis Dipstick, Automated    2. Anxiety    3. Depression, unspecified depression type    4. Need for hepatitis C screening test  -     Hepatitis C Antibody    5. Vitamin D deficiency  -     Cancel: Vitamin D,25-Hydroxy  -     Vitamin D,25-Hydroxy    6. Primary hypertension    7. Seasonal allergic rhinitis, unspecified trigger  Assessment & Plan:  Will add Singulair.   Risk discussed understood.  Education provided.  Let me know in 1 week if no improvement, sooner if worsens.  Return to clinic or ED with any issues or concerns.    Orders:  -     montelukast (Singulair) 10 MG tablet; Take 1 tablet by mouth Every Night.  Dispense: 30 tablet; Refill: 3    8. Type 1 diabetes mellitus without complication  Assessment & Plan:  Will place endocrinology referral for further evaluation.  Insulin refilled.  Education provided on signs symptoms of hypoglycemia and what to do if occurs.  Proper diet and exercise plan discussed and encouraged.  Annual eye exams encouraged and he states he will get that scheduled himself.  Check feet daily.  Risk of meds discussed and understood.  Education provided.  Return to clinic or ED with any issues or concerns.    Orders:  -     Cancel: Hemoglobin A1c  -     POC Microalbumin; Future  -     Cancel: Ambulatory Referral to Endocrinology  -     Insulin Lispro (humaLOG) 100 UNIT/ML injection; INJECT 4 TO 18 UNITS SUBCUTANEOUSLY 4 TIMES DAILY VIA INSULIN PUMP AS DIRECTED  Dispense: 20 mL; Refill: 3  -     Hemoglobin A1c  -     Ambulatory Referral to Endocrinology  -     POC Microalbumin    9. Mixed hyperlipidemia  -     Cancel: Lipid Panel  -     Lipid Panel    10. History of alcohol abuse  Assessment & Plan:  Congratulated him on no longer drinking alcohol.  Risk discussed understood.  He will continue to follow-up with his therapist.      11. Nonspecific finding on examination of urine  -     Urine Culture - Urine, Urine, Clean Catch                      Follow Up   Return in about 3 months (around 9/7/2024), or if symptoms worsen or fail to improve.  Patient was given instructions and counseling regarding his condition or for health maintenance advice. Please see specific information pulled into the AVS if appropriate.     CIARA Richey

## 2024-06-07 NOTE — ASSESSMENT & PLAN NOTE
Will place endocrinology referral for further evaluation.  Insulin refilled.  Education provided on signs symptoms of hypoglycemia and what to do if occurs.  Proper diet and exercise plan discussed and encouraged.  Annual eye exams encouraged and he states he will get that scheduled himself.  Check feet daily.  Risk of meds discussed and understood.  Education provided.  Return to clinic or ED with any issues or concerns.

## 2024-06-07 NOTE — ASSESSMENT & PLAN NOTE
Congratulated him on no longer drinking alcohol.  Risk discussed understood.  He will continue to follow-up with his therapist.

## 2024-06-07 NOTE — ASSESSMENT & PLAN NOTE
Will add Singulair.  Risk discussed understood.  Education provided.  Let me know in 1 week if no improvement, sooner if worsens.  Return to clinic or ED with any issues or concerns.

## 2024-06-08 LAB
25(OH)D3+25(OH)D2 SERPL-MCNC: 19.8 NG/ML (ref 30–100)
ALBUMIN SERPL-MCNC: 4.5 G/DL (ref 4.3–5.2)
ALBUMIN/GLOB SERPL: 1.9 {RATIO} (ref 1.2–2.2)
ALP SERPL-CCNC: 108 IU/L (ref 44–121)
ALT SERPL-CCNC: 32 IU/L (ref 0–44)
AST SERPL-CCNC: 19 IU/L (ref 0–40)
BASOPHILS # BLD AUTO: 0.1 X10E3/UL (ref 0–0.2)
BASOPHILS NFR BLD AUTO: 1 %
BILIRUB SERPL-MCNC: 0.6 MG/DL (ref 0–1.2)
BUN SERPL-MCNC: 19 MG/DL (ref 6–20)
BUN/CREAT SERPL: 22 (ref 9–20)
CALCIUM SERPL-MCNC: 10.4 MG/DL (ref 8.7–10.2)
CHLORIDE SERPL-SCNC: 99 MMOL/L (ref 96–106)
CHOLEST SERPL-MCNC: 222 MG/DL (ref 100–199)
CO2 SERPL-SCNC: 26 MMOL/L (ref 20–29)
CREAT SERPL-MCNC: 0.87 MG/DL (ref 0.76–1.27)
EGFRCR SERPLBLD CKD-EPI 2021: 119 ML/MIN/1.73
EOSINOPHIL # BLD AUTO: 0.2 X10E3/UL (ref 0–0.4)
EOSINOPHIL NFR BLD AUTO: 3 %
ERYTHROCYTE [DISTWIDTH] IN BLOOD BY AUTOMATED COUNT: 13 % (ref 11.6–15.4)
GLOBULIN SER CALC-MCNC: 2.4 G/DL (ref 1.5–4.5)
GLUCOSE SERPL-MCNC: 290 MG/DL (ref 70–99)
HBA1C MFR BLD: 10.2 % (ref 4.8–5.6)
HCT VFR BLD AUTO: 50.7 % (ref 37.5–51)
HCV IGG SERPL QL IA: NON REACTIVE
HDLC SERPL-MCNC: 87 MG/DL
HGB BLD-MCNC: 17.4 G/DL (ref 13–17.7)
IMM GRANULOCYTES # BLD AUTO: 0 X10E3/UL (ref 0–0.1)
IMM GRANULOCYTES NFR BLD AUTO: 1 %
LDLC SERPL CALC-MCNC: 122 MG/DL (ref 0–99)
LYMPHOCYTES # BLD AUTO: 1.5 X10E3/UL (ref 0.7–3.1)
LYMPHOCYTES NFR BLD AUTO: 25 %
MCH RBC QN AUTO: 30.3 PG (ref 26.6–33)
MCHC RBC AUTO-ENTMCNC: 34.3 G/DL (ref 31.5–35.7)
MCV RBC AUTO: 88 FL (ref 79–97)
MONOCYTES # BLD AUTO: 0.4 X10E3/UL (ref 0.1–0.9)
MONOCYTES NFR BLD AUTO: 7 %
NEUTROPHILS # BLD AUTO: 3.9 X10E3/UL (ref 1.4–7)
NEUTROPHILS NFR BLD AUTO: 63 %
PLATELET # BLD AUTO: 203 X10E3/UL (ref 150–450)
POTASSIUM SERPL-SCNC: 4.6 MMOL/L (ref 3.5–5.2)
PROT SERPL-MCNC: 6.9 G/DL (ref 6–8.5)
RBC # BLD AUTO: 5.74 X10E6/UL (ref 4.14–5.8)
SODIUM SERPL-SCNC: 139 MMOL/L (ref 134–144)
TRIGL SERPL-MCNC: 76 MG/DL (ref 0–149)
TSH SERPL DL<=0.005 MIU/L-ACNC: 1.35 UIU/ML (ref 0.45–4.5)
VLDLC SERPL CALC-MCNC: 13 MG/DL (ref 5–40)
WBC # BLD AUTO: 6.1 X10E3/UL (ref 3.4–10.8)

## 2024-06-09 LAB
BACTERIA UR CULT: NO GROWTH
BACTERIA UR CULT: NORMAL

## 2024-06-17 ENCOUNTER — TELEPHONE (OUTPATIENT)
Dept: FAMILY MEDICINE CLINIC | Facility: CLINIC | Age: 29
End: 2024-06-17
Payer: MEDICAID

## 2024-06-17 NOTE — TELEPHONE ENCOUNTER
HUB TO RELAY    Please let patient know labs are abnormal.  Recommend he schedule an appointment with me so that we can discuss the results and make the proper medication adjustments.  Needs appointment.  Thanks

## 2024-06-25 NOTE — TELEPHONE ENCOUNTER
Caller: Parth Bright    Relationship: Self    Best call back number: [phone]    Requested Prescriptions:   Requested Prescriptions     Pending Prescriptions Disp Refills    Continuous Glucose Transmitter (Dexcom G6 Transmitter) [facility]          Pharmacy where request should be sent:    [facility]  [phone] [address]     Last office visit with prescribing clinician: 6/7/2024   Last telemedicine visit with prescribing clinician: Visit date not found   Next office visit with prescribing clinician: 6/28/2024     Additional details provided by patient: PATIENT NEEDS THIS ASAP, PLEASE    Does the patient have less than a 3 day supply:  [x] Yes  [] No    Would you like a call back once the refill request has been completed: [] Yes [x] No    If the office needs to give you a call back, can they leave a voicemail: [] Yes [x] No    Pallavi Shabazz Rep   06/25/24 11:54 EDT

## 2024-06-26 RX ORDER — PROCHLORPERAZINE 25 MG/1
1 SUPPOSITORY RECTAL DAILY
Qty: 1 EACH | Refills: 0 | Status: SHIPPED | OUTPATIENT
Start: 2024-06-26

## 2024-06-28 ENCOUNTER — TELEPHONE (OUTPATIENT)
Dept: FAMILY MEDICINE CLINIC | Facility: CLINIC | Age: 29
End: 2024-06-28

## 2024-06-28 ENCOUNTER — OFFICE VISIT (OUTPATIENT)
Dept: FAMILY MEDICINE CLINIC | Facility: CLINIC | Age: 29
End: 2024-06-28
Payer: MEDICAID

## 2024-06-28 VITALS
HEIGHT: 72 IN | OXYGEN SATURATION: 99 % | WEIGHT: 200.06 LBS | SYSTOLIC BLOOD PRESSURE: 134 MMHG | DIASTOLIC BLOOD PRESSURE: 84 MMHG | HEART RATE: 94 BPM | BODY MASS INDEX: 27.1 KG/M2

## 2024-06-28 DIAGNOSIS — E83.52 SERUM CALCIUM ELEVATED: Primary | ICD-10-CM

## 2024-06-28 DIAGNOSIS — N52.9 ERECTILE DYSFUNCTION, UNSPECIFIED ERECTILE DYSFUNCTION TYPE: ICD-10-CM

## 2024-06-28 DIAGNOSIS — E55.9 VITAMIN D DEFICIENCY: ICD-10-CM

## 2024-06-28 DIAGNOSIS — E10.65 TYPE 1 DIABETES MELLITUS WITH HYPERGLYCEMIA: ICD-10-CM

## 2024-06-28 DIAGNOSIS — Z96.41 INSULIN PUMP IN PLACE: ICD-10-CM

## 2024-06-28 DIAGNOSIS — E78.2 MIXED HYPERLIPIDEMIA: ICD-10-CM

## 2024-06-28 PROCEDURE — 99214 OFFICE O/P EST MOD 30 MIN: CPT | Performed by: NURSE PRACTITIONER

## 2024-06-28 PROCEDURE — 3075F SYST BP GE 130 - 139MM HG: CPT | Performed by: NURSE PRACTITIONER

## 2024-06-28 PROCEDURE — 1159F MED LIST DOCD IN RCRD: CPT | Performed by: NURSE PRACTITIONER

## 2024-06-28 PROCEDURE — 3046F HEMOGLOBIN A1C LEVEL >9.0%: CPT | Performed by: NURSE PRACTITIONER

## 2024-06-28 PROCEDURE — 1126F AMNT PAIN NOTED NONE PRSNT: CPT | Performed by: NURSE PRACTITIONER

## 2024-06-28 PROCEDURE — 3079F DIAST BP 80-89 MM HG: CPT | Performed by: NURSE PRACTITIONER

## 2024-06-28 PROCEDURE — 1160F RVW MEDS BY RX/DR IN RCRD: CPT | Performed by: NURSE PRACTITIONER

## 2024-06-28 RX ORDER — ERGOCALCIFEROL 1.25 MG/1
50000 CAPSULE ORAL WEEKLY
Qty: 12 CAPSULE | Refills: 0 | Status: SHIPPED | OUTPATIENT
Start: 2024-06-28

## 2024-06-28 RX ORDER — ATORVASTATIN CALCIUM 10 MG/1
10 TABLET, FILM COATED ORAL DAILY
Qty: 90 TABLET | Refills: 1 | Status: SHIPPED | OUTPATIENT
Start: 2024-06-28

## 2024-06-28 NOTE — ASSESSMENT & PLAN NOTE
Will check testosterone levels per patient request.  Education provided.  Proper diet and exercise plan discussed and encouraged.  Denies urology referral at this time.  Return to clinic or ED with any issues or concerns.

## 2024-06-28 NOTE — TELEPHONE ENCOUNTER
Can you call endocrinology office that you set him up with and ask if they can get him in asap. Type 1 diabetes that is uncontrolled with insulin pump. Thanks

## 2024-06-28 NOTE — PROGRESS NOTES
"Chief Complaint  Abnormal Lab    Subjective          Parth Bright presents to [facility]  Abnormal Lab  Pertinent negatives include no abdominal pain, chills, congestion, coughing, fatigue, fever, nausea, numbness, rash, vomiting or weakness.       Presents to discuss abnormal labs that he had drawn 6/7/2024.  He is a type I diabetic and does have an insulin pump.  States he has been having trouble getting his Dexcom supplies but states it is now in stock so he will go today and pick that up.  I have since placed a referral to endocrinology for further evaluation of his diabetes.  He states they have contacted him for an appointment but he missed the call so he plans on calling them back today to see if they can get him in stat.  States feet are fine with no cuts or sores.  No vision issues.  Glucose was 290 with his blood work and A1c was elevated at 10.2.    Calcium level was also elevated at 10.4 so we will recheck labs today.    Total cholesterol and LDL were elevated at 222 and 122 respectively.  He is not on any type of cholesterol-lowering medication.    Vitamin D was low at 19.8.    He also states he has trouble with erectile dysfunction.  He states he knows it is probably due to his history of type 1 diabetes and his past alcohol use but he would like to have his testosterone levels checked today.    He continues to be sober.    Objective   Vital Signs:   /84   Pulse 94   Ht 182.9 cm (72\")   Wt 90.7 kg (200 lb 1 oz)   SpO2 99%   BMI 27.13 kg/m²     Body mass index is 27.13 kg/m².    Review of Systems   Constitutional:  Negative for chills, fatigue and fever.   HENT:  Negative for congestion.    Eyes:  Negative for visual disturbance.   Respiratory:  Negative for cough and wheezing.    Cardiovascular: Negative.    Gastrointestinal:  Negative for abdominal pain, constipation, diarrhea, nausea and vomiting.   Genitourinary:  Negative for decreased urine volume, " dysuria, frequency, hematuria and urgency.   Musculoskeletal:  Negative for back pain.   Skin:  Negative for rash, skin lesions and wound.   Neurological:  Negative for dizziness, weakness, light-headedness, numbness and headache.   Psychiatric/Behavioral:  Negative for suicidal ideas.        Past History:  Medical History: has a past medical history of Diabetes mellitus type 1.   Surgical History: has a past surgical history that includes Knee surgery (Right, 07/25/2022).   Family History: family history includes Diabetes type I in his mother.   Social History: reports that he has never smoked. He has quit using smokeless tobacco.  His smokeless tobacco use included chew. He reports that he does not currently use alcohol. He reports that he does not use drugs.    PHQ-2 Depression Screening  Little interest or pleasure in doing things?     Feeling down, depressed, or hopeless?     PHQ-2 Total Score          PHQ-9 Depression Screening  Little interest or pleasure in doing things?     Feeling down, depressed, or hopeless?     Trouble falling or staying asleep, or sleeping too much?     Feeling tired or having little energy?     Poor appetite or overeating?     Feeling bad about yourself - or that you are a failure or have let yourself or your family down?     Trouble concentrating on things, such as reading the newspaper or watching television?     Moving or speaking so slowly that other people could have noticed? Or the opposite - being so fidgety or restless that you have been moving around a lot more than usual?     Thoughts that you would be better off dead, or of hurting yourself in some way?     PHQ-9 Total Score     If you checked off any problems, how difficult have these problems made it for you to do your work, take care of things at home, or get along with other people?       PHQ-9 Total Score:        Patient screened positive for depression based on a PHQ-9 score of 0 on 6/7/2024. Follow-up recommendations  include:          Current Outpatient Medications:     Continuous Blood Gluc Sensor (Dexcom G6 Sensor), , Disp: , Rfl:     Continuous Glucose Transmitter (Dexcom G6 Transmitter) misc, Inject 1 Device under the skin into the appropriate area as directed Daily., Disp: 1 each, Rfl: 0    fluticasone (FLONASE) 50 MCG/ACT nasal spray, , Disp: , Rfl:     Insulin Lispro (humaLOG) 100 UNIT/ML injection, INJECT 4 TO 18 UNITS SUBCUTANEOUSLY 4 TIMES DAILY VIA INSULIN PUMP AS DIRECTED, Disp: 20 mL, Rfl: 3    montelukast (Singulair) 10 MG tablet, Take 1 tablet by mouth Every Night., Disp: 30 tablet, Rfl: 3    atorvastatin (LIPITOR) 10 MG tablet, Take 1 tablet by mouth Daily., Disp: 90 tablet, Rfl: 1    vitamin D (ERGOCALCIFEROL) 1.25 MG (12375 UT) capsule capsule, Take 1 capsule by mouth 1 (One) Time Per Week., Disp: 12 capsule, Rfl: 0   (Not in a hospital admission)     Allergies: Penicillins and Levofloxacin    Physical Exam  Constitutional:       Appearance: Normal appearance.   Eyes:      Conjunctiva/sclera: Conjunctivae normal.      Pupils: Pupils are equal, round, and reactive to light.   Cardiovascular:      Rate and Rhythm: Normal rate and regular rhythm.      Heart sounds: Normal heart sounds.   Pulmonary:      Effort: Pulmonary effort is normal.      Breath sounds: Normal breath sounds.   Genitourinary:     Comments: Denied   Neurological:      General: No focal deficit present.      Mental Status: He is alert and oriented to person, place, and time. Mental status is at baseline.   Psychiatric:         Mood and Affect: Mood normal.         Behavior: Behavior normal.         Thought Content: Thought content normal.         Judgment: Judgment normal.          Result Review :                   Assessment and Plan    Diagnoses and all orders for this visit:    1. Serum calcium elevated (Primary)  Assessment & Plan:  He is not taking any calcium supplement.  Recheck labs today.  Education provided.  If remains elevated  informed him to discuss further with his endocrinologist.    Orders:  -     Basic Metabolic Panel  -     Calcium, Ionized  -     PTH, Intact    2. Erectile dysfunction, unspecified erectile dysfunction type  Assessment & Plan:  Will check testosterone levels per patient request.  Education provided.  Proper diet and exercise plan discussed and encouraged.  Denies urology referral at this time.  Return to clinic or ED with any issues or concerns.    Orders:  -     Testosterone, Free, Total    3. Vitamin D deficiency  Assessment & Plan:  Will start vitamin D supplement.  Recheck level 3 months.  Education provided.    Orders:  -     vitamin D (ERGOCALCIFEROL) 1.25 MG (63750 UT) capsule capsule; Take 1 capsule by mouth 1 (One) Time Per Week.  Dispense: 12 capsule; Refill: 0    4. Type 1 diabetes mellitus with hyperglycemia  Assessment & Plan:  He has an insulin pump.  He states he does not use it.  States he will  all of his supplies for his Dexcom today so he can monitor his blood sugars more closely.  Check feet daily.  Annual eye exams encouraged.  Proper diet and exercise plan discussed and encouraged.  Education provided on signs symptoms of hypoglycemia and what to do if occurs.  Will ask the referral team to check on his endocrinology referral to see if we can get him in somewhere stat.  Return to clinic or ED with any issues or concerns.      5. Insulin pump in place    6. Mixed hyperlipidemia  Assessment & Plan:  Discussed recent labs.  We will start atorvastatin.  Risk discussed and understood.  Proper diet and exercise plan discussed and encouraged.  Return to clinic or ED with any issues or concerns.    Orders:  -     atorvastatin (LIPITOR) 10 MG tablet; Take 1 tablet by mouth Daily.  Dispense: 90 tablet; Refill: 1                      Follow Up   Return in about 3 months (around 9/28/2024).  Patient was given instructions and counseling regarding his condition or for health maintenance advice.  Please see specific information pulled into the AVS if appropriate.     CIARA Richey

## 2024-06-28 NOTE — ASSESSMENT & PLAN NOTE
Discussed recent labs.  We will start atorvastatin.  Risk discussed and understood.  Proper diet and exercise plan discussed and encouraged.  Return to clinic or ED with any issues or concerns.

## 2024-06-28 NOTE — ASSESSMENT & PLAN NOTE
He has an insulin pump.  He states he does not use it.  States he will  all of his supplies for his Dexcom today so he can monitor his blood sugars more closely.  Check feet daily.  Annual eye exams encouraged.  Proper diet and exercise plan discussed and encouraged.  Education provided on signs symptoms of hypoglycemia and what to do if occurs.  Will ask the referral team to check on his endocrinology referral to see if we can get him in somewhere stat.  Return to clinic or ED with any issues or concerns.

## 2024-06-28 NOTE — ASSESSMENT & PLAN NOTE
He is not taking any calcium supplement.  Recheck labs today.  Education provided.  If remains elevated informed him to discuss further with his endocrinologist.

## 2024-07-10 LAB
BUN SERPL-MCNC: 13 MG/DL (ref 6–20)
BUN/CREAT SERPL: 17 (ref 9–20)
CA-I SERPL ISE-MCNC: 5.3 MG/DL (ref 4.5–5.6)
CALCIUM SERPL-MCNC: 9.8 MG/DL (ref 8.7–10.2)
CHLORIDE SERPL-SCNC: 101 MMOL/L (ref 96–106)
CO2 SERPL-SCNC: 26 MMOL/L (ref 20–29)
CREAT SERPL-MCNC: 0.77 MG/DL (ref 0.76–1.27)
EGFRCR SERPLBLD CKD-EPI 2021: 124 ML/MIN/1.73
GLUCOSE SERPL-MCNC: 229 MG/DL (ref 70–99)
POTASSIUM SERPL-SCNC: 4 MMOL/L (ref 3.5–5.2)
PTH-INTACT SERPL-MCNC: 9 PG/ML (ref 15–65)
SODIUM SERPL-SCNC: 141 MMOL/L (ref 134–144)
TESTOST FREE SERPL-MCNC: 11.6 PG/ML (ref 8.7–25.1)
TESTOST SERPL-MCNC: 661 NG/DL (ref 264–916)

## 2024-07-11 ENCOUNTER — TELEPHONE (OUTPATIENT)
Dept: FAMILY MEDICINE CLINIC | Facility: CLINIC | Age: 29
End: 2024-07-11
Payer: MEDICAID

## 2024-07-11 NOTE — TELEPHONE ENCOUNTER
Please ask the referral team to add on low PTH to his endocrinology referral because it looks like he is already been scheduled with them on the 22nd of this month.  Thanks

## 2024-07-11 NOTE — TELEPHONE ENCOUNTER
HUB TO RELAY    Let patient know calcium level back to normal.  His PTH is low.  This is the parathyroid hormone.  This could indicate hypoparathyroidism which is where the parathyroid gland is not secreting hormones as it should.  Would recommend he discuss this further with his endocrinologist when he sees them. He needs to mention this to them when he see's them.     Testosterone levels normal.

## 2024-07-11 NOTE — TELEPHONE ENCOUNTER
Name: Parth Bright    Relationship: Self    Best Callback Number: [phone]    HUB PROVIDED THE RELAY MESSAGE FROM THE OFFICE   PATIENT VOICED UNDERSTANDING AND HAS NO FURTHER QUESTIONS AT THIS TIME    ADDITIONAL INFORMATION:

## 2024-07-15 ENCOUNTER — TELEPHONE (OUTPATIENT)
Dept: FAMILY MEDICINE CLINIC | Facility: CLINIC | Age: 29
End: 2024-07-15

## 2024-07-15 NOTE — TELEPHONE ENCOUNTER
Caller: KELLIE WITH [employer] [facility]    Relationship: Other    Best call back number:[phone] [address]     What form or medical record are you requesting: LETTER OF MEDICAL OF NECESSITY AND SIGNED & DATED PROGRESS NOTE FROM 06/07/2024     Who is requesting this form or medical record from you: KELLIE WITH [employer]    How would you like to receive the form or medical records (pick-up, mail, fax): FAX  If fax, what is the fax number: [phone]      Timeframe paperwork needed: ASAP    Additional notes: KELLIE WITH [employer] ADVISED THE PRACTICE FAXED THE PAPERWORK TO THEM ON 07/12/2024 FOR THE PATIENT'S INSULIN PUMP,. BUT ADVISED THE PAPERWORK WAS CUT OFF AND DID NOT TRANSMIT PROPERLY. KELLIE ALSO UNDERSCORED THAT THE PROGRESS NOTES FROM 06/07/2024 MUST BE SIGNED AND DATED.       PLEASE CALL IF ANY QUESTIONS OR CONCERNS.

## 2024-07-21 NOTE — PROGRESS NOTES
Chief Complaint  Diabetes (New pt, est care, med mgt, A1c eval, cgm/pump eval )    Referred By: CIARA Richey presents to [facility] for diabetes medication management    History of Present Illness    Visit type:  to establish care  Diabetes type:  Type 1  Age at time of dx/Year of dx/Number of years: Diagnosis 8 years of age  Family History of Diabetes: Maternal grandfather, paternal grandfather, mother, and father  Current diabetes status/concerns/issues: Insulin pump settings lined out and need to work on my diet  Other current health concerns: Erectile dysfunction  Current Diabetes symptoms:    Polyuria: Yes when blood sugar's elevated   Polydipsia: Yes when blood sugar's elevated   Polyphagia: No   Blurred vision: Yes when blood sugar is elevated, sometimes   Excessive fatigue: No  Known Diabetes complications:  Neuropathy: Numbness and Shooting Pain; Location: Feet, Hands, and Bilateral  Renal: Normal eGFR per current labs  Eyes: Moderate Nonproliferative Retinopathy and Without Macular Edema; Location: Bilateral; Last Eye Exam:  3/29/2023 ; Location: [facility]  Amputation/Wounds: None  GI: Reflux and Indigestion  Cardiovascular: Hypertension and Hyperlipidemia  ED: Patient Reported  Other: None  Hospitalizations/ED/911 secondary to DM?  Yes, last time hospitalized approximately 3 weeks ago for DKA  Hypoglycemia:   None per CGM  Hypoglycemia Symptoms:  No hypoglycemia at this time  Current Diabetes treatment: Humalog via andem t:slim X2 to insulin pump  Prior diabetes treatments:  Intolerable of Metformin  Using ACEI or ARB:  Plans to prescribe this visit  Using Statin: Yes, Lipitor 10 mg daily  Blood glucose device:  Dexcom CGM  Blood glucose monitoring frequency:  Continuous per CGM  Blood glucose range/average:  The 14-day sensor report shows an average glucose of 278 mg/dL, with 8% % in target range (  mgdL), 32% % in the high range (181-250 mg/dL), 59% % in the very high range (>250 mg/dL), 0% in the low range (54-70 mg/dL) and 0% in the very low range (<54 mg/dL).   Glucose Source: Device Reviewed  Dietary behavior:  Limits high carb/sweet foods, Avoids sugary drinks, admits to snacking and needing to count carbohydrates  Activity/Exercise:   Active outdoors when home  Last Foot Exam:  Per PCP  Diabetes Education Hx: Comprehensive Diabetes Education and Diabetes Nutrition Counseling  Social Determinants of Health: None    Past Medical History:   Diagnosis Date    Diabetes mellitus type 1      Past Surgical History:   Procedure Laterality Date    KNEE SURGERY Right 07/25/2022     Family History   Problem Relation Age of Onset    Diabetes type I Mother      Social History     Socioeconomic History    Marital status:    Tobacco Use    Smoking status: Never    Smokeless tobacco: Former     Types: Chew   Vaping Use    Vaping status: Some Days    Substances: Nicotine   Substance and Sexual Activity    Alcohol use: Not Currently     Comment: occasionally on the weekends    Drug use: Never    Sexual activity: Defer     Allergies   Allergen Reactions    Penicillins Shortness Of Breath    Levofloxacin Other (See Comments)     KIDNEY FAILURE       Current Outpatient Medications:     atorvastatin (LIPITOR) 10 MG tablet, Take 1 tablet by mouth Daily., Disp: 90 tablet, Rfl: 1    Continuous Blood Gluc Sensor (Dexcom G6 Sensor), , Disp: , Rfl:     Continuous Glucose Transmitter (Dexcom G6 Transmitter) misc, Inject 1 Device under the skin into the appropriate area as directed Daily., Disp: 1 each, Rfl: 0    fluticasone (FLONASE) 50 MCG/ACT nasal spray, , Disp: , Rfl:     Insulin Lispro (humaLOG) 100 UNIT/ML injection, Inject 100 Units under the skin into the appropriate area as directed Daily for 180 days. Max daily dose of 100 units per insulin pump, Disp: 9000 Units, Rfl: 1    montelukast (Singulair) 10 MG tablet, Take  "1 tablet by mouth Every Night., Disp: 30 tablet, Rfl: 3    vitamin D (ERGOCALCIFEROL) 1.25 MG (29445 UT) capsule capsule, Take 1 capsule by mouth 1 (One) Time Per Week., Disp: 12 capsule, Rfl: 0    losartan (Cozaar) 25 MG tablet, Take 1 tablet by mouth Daily., Disp: 30 tablet, Rfl: 11    tadalafil (Cialis) 10 MG tablet, Take 1 tablet by mouth As Needed for Erectile Dysfunction., Disp: 20 tablet, Rfl: 1    Objective     Vitals:    07/22/24 1516   BP: 145/94   Pulse: 101   SpO2: 97%   Weight: 90.7 kg (200 lb)   Height: 182.9 cm (72\")   PainSc: 0-No pain     Body mass index is 27.12 kg/m².    Physical Exam  Constitutional:       Appearance: Normal appearance. He is normal weight.      Comments: Overweight (BMI 25 - 29.9) Pt Current BMI = 27.12     HENT:      Head: Normocephalic and atraumatic.      Right Ear: External ear normal.      Left Ear: External ear normal.      Nose: Nose normal.   Eyes:      Extraocular Movements: Extraocular movements intact.      Conjunctiva/sclera: Conjunctivae normal.   Pulmonary:      Effort: Pulmonary effort is normal.   Musculoskeletal:         General: Normal range of motion.      Cervical back: Normal range of motion.   Skin:     General: Skin is warm and dry.   Neurological:      General: No focal deficit present.      Mental Status: He is alert and oriented to person, place, and time. Mental status is at baseline.   Psychiatric:         Mood and Affect: Mood normal.         Behavior: Behavior normal.         Thought Content: Thought content normal.         Judgment: Judgment normal.             Result Review :   The following data was reviewed by: CIARA Becerra on 07/22/2024:    Most Recent A1C          7/22/2024    15:23   HGBA1C Most Recent   Hemoglobin A1C 9.4        A1C Last 3 Results          6/7/2024    11:56 7/22/2024    15:23   HGBA1C Last 3 Results   Hemoglobin A1C 10.2  9.4      A1c collected in the office today is 9.4%, indicating Uncontrolled Type I diabetes.  " "This result is down from the prior result of 10.2% collected on 6/7/2024    Glucose   Date Value Ref Range Status   07/22/2024 491 (C) 70 - 99 mg/dL Final     Comment:     Serial Number: [id number]:  [id number]   08/30/2022 154 (H) 71 - 139 mg/dL Final     Comment:     H   07/25/2022 237 (H) 74 - 99 mg/dL Final     Comment:     Accuracy of a glucose result obtained from a capillary whole blood specimen relies upon adequate, non-compromised capillary blood flow.  If the capillary glucose result is not consistent with the patient's clinical signs and symptoms, glucose testing should be repeated with either an arterial or venous sample on the glucometer or sent to the main labortory for testing.     Point of care glucose in the office today is  elevated at 491 mg/dL    Creatinine   Date Value Ref Range Status   06/28/2024 0.77 0.76 - 1.27 mg/dL Final   06/07/2024 0.87 0.76 - 1.27 mg/dL Final   08/29/2022 0.88 0.73 - 1.18 mg/dL Final   08/29/2022 0.83 0.73 - 1.18 mg/dL Final     No results found for: \"EGFR\"  Labs collected on 6/28/2024 show Normal values lab value available under chart review    No results found for: \"MICROALBUR\"  Creatinine, Urine   Date Value Ref Range Status   08/16/2016 203.4 mg/dL Final     No results found for: \"MALBCRERATIO\"  Urine microalbuminuria collected on 6/7/2024 is positive for microalbuminuria available in chart review    Total Cholesterol   Date Value Ref Range Status   08/16/2016 112 0 - 200 mg/dL Final     Triglycerides   Date Value Ref Range Status   06/07/2024 76 0 - 149 mg/dL Final   04/20/2023 72 0 - 149 mg/dL Final   08/16/2016 66 0 - 150 mg/dL Final     HDL Cholesterol   Date Value Ref Range Status   06/07/2024 87 >39 mg/dL Final   04/20/2023 73 >39 mg/dL Final   08/16/2016 53 40 - 60 mg/dL Final     LDL Chol Calc (NIH)   Date Value Ref Range Status   06/07/2024 122 (H) 0 - 99 mg/dL Final   04/20/2023 160 (H) 0 - 99 mg/dL Final     Lipid panel collected on 6/7/2024 shows " Hyperlipidemia and Hypercholesterolemia            Assessment: Patient was referred by his primary care provider for insulin pump and medication management.  Patient admits to not entering carbohydrates into his insulin pump and has only been using current CGM data to base boluses off of.  Reviewed patient's insulin pump and CGM report which shows: The 14-day sensor report shows an average glucose of 278 mg/dL, with 8% % in target range ( mgdL), 32% % in the high range (181-250 mg/dL), 59% % in the very high range (>250 mg/dL), 0% in the low range (54-70 mg/dL) and 0% in the very low range (<54 mg/dL).  1 limitation of the current insulin pump settings as the basal limit of 1.4 units an hour.  The patient's pump is unable to increase the basal rate and Control IQ mode to correct for hyperglycemia based on this limitation.  The patient is hypertensive in the office today with a blood pressure of 145/94.  The patient also expressed some quality of life concerns regarding erectile dysfunction due to diabetes.     The patient was provided an insulin pump inset and changed his infusion site at the beginning of the visit due to concerns of hyperglycemia.      Diagnoses and all orders for this visit:    1. Uncontrolled type 1 diabetes mellitus with hyperglycemia, with long-term current use of insulin (Primary)  -     POC Glycosylated Hemoglobin (Hb A1C)  -     POC Glucose  -     Insulin Lispro (humaLOG) 100 UNIT/ML injection; Inject 100 Units under the skin into the appropriate area as directed Daily for 180 days. Max daily dose of 100 units per insulin pump  Dispense: 9000 Units; Refill: 1    2. Type 1 diabetes mellitus with diabetic polyneuropathy    3. Primary hypertension  -     losartan (Cozaar) 25 MG tablet; Take 1 tablet by mouth Daily.  Dispense: 30 tablet; Refill: 11    4. Erectile dysfunction due to type 1 diabetes mellitus  -     tadalafil (Cialis) 10 MG tablet; Take 1 tablet by mouth As Needed for  Erectile Dysfunction.  Dispense: 20 tablet; Refill: 1    Other orders  -     Cancel: POC Glycosylated Hemoglobin (Hb A1C)        Plan: The current changes were made to the insulin pump setting:    The maximum basal rate was increased to 3 units/h.  The 12 AM to 6 AM basal rate of 1.0 was increased to 1.2 units/h, and the correction factor was changed from 1:45 to 1:35.  The 6 AM to 12 noon basal rate of 1.25 units/h was increased to 1.4 units/h, and the correction factor of 1:45 was reduced to 1:35.  The 12 noon to midnight basal rate was increased from 1.4 units an hour to 1.6 units/h, and the correction factor was reduced from 1:45 to 1:35.  No changes were made to the aggressive carb to insulin ratio of 1:5.  Patient was instructed to call the office if he is to have hypoglycemia after insulin pump changes.  The patient will work on entering carbs with each meal to prevent postprandial hyperglycemic excursions. The patient's blood sugar was below 400 and trending down per CGM at the completion of the visit. Losartan 25 mg once daily was prescribed for hypertension and renal protection. Cialis 10 mg as needed was prescribed to address the patient's concerns with erectile dysfunction. The patient was scheduled for a 1 month follow-up at the [facility] location.      The patient will monitor his blood glucose levels per CGM.  If he develops problematic hyperglycemia or hypoglycemia or adverse drug reactions, he will contact the office for further instructions.        Follow Up     Return in about 4 weeks (around 8/19/2024) for Insulin Pump Follow-Up, CGM Follow-Up, Medication Management.    Patient was given instructions and counseling regarding his condition or for health maintenance advice. Please see specific information pulled into the AVS if appropriate.     Brian Esparza, APRN  07/22/2024      Dictated Utilizing Dragon Dictation.  Please note that portions of this note were completed with a voice  recognition program.  Part of this note may be an electronic transcription/translation of spoken language to printed text using the Dragon Dictation System.

## 2024-07-22 ENCOUNTER — TELEPHONE (OUTPATIENT)
Dept: FAMILY MEDICINE CLINIC | Facility: CLINIC | Age: 29
End: 2024-07-22
Payer: MEDICAID

## 2024-07-22 ENCOUNTER — OFFICE VISIT (OUTPATIENT)
Dept: DIABETES SERVICES | Facility: HOSPITAL | Age: 29
End: 2024-07-22
Payer: MEDICAID

## 2024-07-22 VITALS
HEIGHT: 72 IN | HEART RATE: 101 BPM | BODY MASS INDEX: 27.09 KG/M2 | SYSTOLIC BLOOD PRESSURE: 145 MMHG | WEIGHT: 200 LBS | OXYGEN SATURATION: 97 % | DIASTOLIC BLOOD PRESSURE: 94 MMHG

## 2024-07-22 DIAGNOSIS — N52.9 ERECTILE DYSFUNCTION DUE TO TYPE 1 DIABETES MELLITUS: ICD-10-CM

## 2024-07-22 DIAGNOSIS — I10 PRIMARY HYPERTENSION: ICD-10-CM

## 2024-07-22 DIAGNOSIS — E10.69 ERECTILE DYSFUNCTION DUE TO TYPE 1 DIABETES MELLITUS: ICD-10-CM

## 2024-07-22 DIAGNOSIS — E10.42 TYPE 1 DIABETES MELLITUS WITH DIABETIC POLYNEUROPATHY: ICD-10-CM

## 2024-07-22 DIAGNOSIS — E10.65 UNCONTROLLED TYPE 1 DIABETES MELLITUS WITH HYPERGLYCEMIA, WITH LONG-TERM CURRENT USE OF INSULIN: Primary | ICD-10-CM

## 2024-07-22 PROBLEM — S83.281A ACUTE LATERAL MENISCUS TEAR OF RIGHT KNEE: Status: ACTIVE | Noted: 2022-05-17

## 2024-07-22 PROBLEM — E10.10 DKA, TYPE 1, NOT AT GOAL: Status: ACTIVE | Noted: 2022-05-27

## 2024-07-22 PROBLEM — M62.81 QUADRICEPS WEAKNESS: Status: ACTIVE | Noted: 2022-09-27

## 2024-07-22 PROBLEM — E10.9 TYPE 1 DIABETES MELLITUS: Status: ACTIVE | Noted: 2022-08-29

## 2024-07-22 PROBLEM — U07.1 COVID-19: Status: ACTIVE | Noted: 2022-08-29

## 2024-07-22 PROBLEM — M25.561 ACUTE PAIN OF RIGHT KNEE: Status: ACTIVE | Noted: 2022-06-07

## 2024-07-22 PROBLEM — S83.251A BUCKET-HANDLE TEAR OF LATERAL MENISCUS OF RIGHT KNEE AS CURRENT INJURY: Status: ACTIVE | Noted: 2022-06-07

## 2024-07-22 PROBLEM — F10.20 ALCOHOL DEPENDENCE: Status: ACTIVE | Noted: 2024-07-22

## 2024-07-22 PROBLEM — J30.9 ALLERGIC RHINITIS: Status: ACTIVE | Noted: 2024-07-22

## 2024-07-22 LAB
EXPIRATION DATE: ABNORMAL
GLUCOSE BLDC GLUCOMTR-MCNC: 491 MG/DL (ref 70–99)
HBA1C MFR BLD: 9.4 % (ref 4.5–5.7)
Lab: ABNORMAL

## 2024-07-22 PROCEDURE — 83036 HEMOGLOBIN GLYCOSYLATED A1C: CPT

## 2024-07-22 PROCEDURE — G0463 HOSPITAL OUTPT CLINIC VISIT: HCPCS

## 2024-07-22 PROCEDURE — 82948 REAGENT STRIP/BLOOD GLUCOSE: CPT

## 2024-07-22 RX ORDER — TADALAFIL 10 MG/1
10 TABLET ORAL AS NEEDED
Qty: 20 TABLET | Refills: 1 | Status: SHIPPED | OUTPATIENT
Start: 2024-07-22 | End: 2025-07-22

## 2024-07-22 RX ORDER — INSULIN LISPRO 100 [IU]/ML
100 INJECTION, SOLUTION INTRAVENOUS; SUBCUTANEOUS DAILY
Qty: 9000 UNITS | Refills: 1 | Status: SHIPPED | OUTPATIENT
Start: 2024-07-22 | End: 2024-07-22

## 2024-07-22 RX ORDER — INSULIN LISPRO 100 [IU]/ML
100 INJECTION, SOLUTION INTRAVENOUS; SUBCUTANEOUS DAILY
Qty: 90 ML | Refills: 1 | Status: SHIPPED | OUTPATIENT
Start: 2024-07-22 | End: 2025-01-18

## 2024-07-22 RX ORDER — LOSARTAN POTASSIUM 25 MG/1
25 TABLET ORAL DAILY
Qty: 30 TABLET | Refills: 11 | Status: SHIPPED | OUTPATIENT
Start: 2024-07-22 | End: 2025-07-22

## 2024-07-22 NOTE — TELEPHONE ENCOUNTER
Caller: [facility]    Relationship:     Best call back number: [phone]  OPTION 8    What is the best time to reach you: ANY    Who are you requesting to speak with (clinical staff, provider,  specific staff member): NURSE    Do you know the name of the person who called: RAFY    What was the call regarding: THEY RECEIVED THE LETTER OF MEDICAL NECESSITY FORM BUT IT WAS NOT DATED.  PLEASE DATE AND REFAX -[phone].     Is it okay if the provider responds through [employer]: CALL IF NEEDED

## 2024-07-24 DIAGNOSIS — E10.65 UNCONTROLLED TYPE 1 DIABETES MELLITUS WITH HYPERGLYCEMIA, WITH LONG-TERM CURRENT USE OF INSULIN: ICD-10-CM

## 2024-07-25 RX ORDER — INSULIN LISPRO 100 [IU]/ML
100 INJECTION, SOLUTION INTRAVENOUS; SUBCUTANEOUS DAILY
Qty: 90 ML | Refills: 1 | OUTPATIENT
Start: 2024-07-25 | End: 2025-01-21

## 2024-07-25 NOTE — TELEPHONE ENCOUNTER
Doesn't he see endocrinology now? Didn't he recently see them? Needs to get refills of this from them...

## 2024-07-26 ENCOUNTER — TELEPHONE (OUTPATIENT)
Dept: DIABETES SERVICES | Facility: HOSPITAL | Age: 29
End: 2024-07-26
Payer: MEDICAID

## 2024-09-13 ENCOUNTER — OFFICE VISIT (OUTPATIENT)
Dept: FAMILY MEDICINE CLINIC | Facility: CLINIC | Age: 29
End: 2024-09-13
Payer: MEDICAID

## 2024-09-13 VITALS
HEART RATE: 99 BPM | WEIGHT: 204.38 LBS | BODY MASS INDEX: 27.68 KG/M2 | SYSTOLIC BLOOD PRESSURE: 138 MMHG | OXYGEN SATURATION: 98 % | DIASTOLIC BLOOD PRESSURE: 88 MMHG | HEIGHT: 72 IN

## 2024-09-13 DIAGNOSIS — I10 PRIMARY HYPERTENSION: ICD-10-CM

## 2024-09-13 DIAGNOSIS — E10.65 TYPE 1 DIABETES MELLITUS WITH HYPERGLYCEMIA: ICD-10-CM

## 2024-09-13 DIAGNOSIS — R80.9 PROTEINURIA, UNSPECIFIED TYPE: ICD-10-CM

## 2024-09-13 DIAGNOSIS — R82.90 NONSPECIFIC FINDING ON EXAMINATION OF URINE: ICD-10-CM

## 2024-09-13 DIAGNOSIS — E78.2 MIXED HYPERLIPIDEMIA: ICD-10-CM

## 2024-09-13 DIAGNOSIS — E55.9 VITAMIN D DEFICIENCY: ICD-10-CM

## 2024-09-13 DIAGNOSIS — J30.2 SEASONAL ALLERGIC RHINITIS, UNSPECIFIED TRIGGER: Primary | ICD-10-CM

## 2024-09-13 DIAGNOSIS — R80.9 PROTEINURIA, UNSPECIFIED TYPE: Primary | ICD-10-CM

## 2024-09-13 DIAGNOSIS — Z79.899 ENCOUNTER FOR LONG-TERM (CURRENT) USE OF OTHER MEDICATIONS: ICD-10-CM

## 2024-09-13 LAB
BILIRUB BLD-MCNC: NEGATIVE MG/DL
CLARITY, POC: CLEAR
COLOR UR: YELLOW
EXPIRATION DATE: ABNORMAL
GLUCOSE UR STRIP-MCNC: ABNORMAL MG/DL
KETONES UR QL: NEGATIVE
LEUKOCYTE EST, POC: NEGATIVE
Lab: ABNORMAL
NITRITE UR-MCNC: NEGATIVE MG/ML
PH UR: 7 [PH] (ref 5–8)
PROT UR STRIP-MCNC: ABNORMAL MG/DL
RBC # UR STRIP: ABNORMAL /UL
SP GR UR: 1.02 (ref 1–1.03)
UROBILINOGEN UR QL: NORMAL

## 2024-09-13 PROCEDURE — 1126F AMNT PAIN NOTED NONE PRSNT: CPT | Performed by: NURSE PRACTITIONER

## 2024-09-13 PROCEDURE — 99214 OFFICE O/P EST MOD 30 MIN: CPT | Performed by: NURSE PRACTITIONER

## 2024-09-13 PROCEDURE — 3075F SYST BP GE 130 - 139MM HG: CPT | Performed by: NURSE PRACTITIONER

## 2024-09-13 PROCEDURE — 1159F MED LIST DOCD IN RCRD: CPT | Performed by: NURSE PRACTITIONER

## 2024-09-13 PROCEDURE — 3079F DIAST BP 80-89 MM HG: CPT | Performed by: NURSE PRACTITIONER

## 2024-09-13 PROCEDURE — 3046F HEMOGLOBIN A1C LEVEL >9.0%: CPT | Performed by: NURSE PRACTITIONER

## 2024-09-13 PROCEDURE — 1160F RVW MEDS BY RX/DR IN RCRD: CPT | Performed by: NURSE PRACTITIONER

## 2024-09-13 RX ORDER — ATORVASTATIN CALCIUM 10 MG/1
10 TABLET, FILM COATED ORAL DAILY
Qty: 90 TABLET | Refills: 1 | Status: SHIPPED | OUTPATIENT
Start: 2024-09-13

## 2024-09-13 RX ORDER — MONTELUKAST SODIUM 10 MG/1
10 TABLET ORAL NIGHTLY
Qty: 90 TABLET | Refills: 1 | Status: SHIPPED | OUTPATIENT
Start: 2024-09-13

## 2024-09-13 NOTE — PROGRESS NOTES
"Chief Complaint  Hypertension, Hyperlipidemia, and Diabetes    Subjective          Parth Bright presents to [facility]  History of Present Illness  History of Present Illness  The patient is a 29-year-old male here for follow-up of diabetes and hyperlipidemia.    He has been experiencing issues with his insulin pump, which has been giving alarms and acting erratically for the past few months. He had to charge it and turn it off due to some log issues. His blood sugar levels have been inconsistent, but he has been trying to maintain them below 250. He has been managing his diabetes since he was 8 or 9 years old, initially with metformin and later with insulin injections in his early 20s.  He has followed up with endocrinologist and states he plans on contacting them ASAP to discuss his insulin pump and to follow-up with his diabetes.  He knows he is due an annual eye exam and states he will schedule.  States feet are fine with no cuts or sores.  States lately his diet has been slacking so he plans on starting back to meal prepping.    He has been on a low dose of blood pressure medication and atorvastatin for cholesterol. His recent blood work with the endocrinologist showed good results. He has completed his vitamin D supplement course.    He has a history of severe allergies and has previously received allergy shots and medication, which were beneficial. Recently, his allergies have worsened, causing sinus drainage. He has been taking Xyzal for a long time, but it became less effective, so he switched back to Singulair, which seems to be more effective. He also experiences intermittent wheezing and nasal congestion. He has not taken any over-the-counter allergy pills.  No fever no chills no body aches.  No signs of infection.    He is overdue for an eye exam and does not receive flu shots.    Objective   Vital Signs:   /88   Pulse 99   Ht 182.9 cm (72\")   Wt 92.7 kg (204 lb " 6 oz)   SpO2 98%   BMI 27.72 kg/m²     Body mass index is 27.72 kg/m².    Review of Systems   Constitutional:  Negative for chills, fatigue and fever.   HENT:  Positive for postnasal drip, rhinorrhea and sneezing. Negative for congestion, sore throat, swollen glands and trouble swallowing.    Eyes:  Negative for visual disturbance.   Respiratory:  Negative for cough, shortness of breath and wheezing.    Cardiovascular: Negative.    Gastrointestinal:  Negative for abdominal pain, constipation, diarrhea, nausea and vomiting.   Endocrine: Negative for polydipsia, polyphagia and polyuria.   Genitourinary:  Negative for decreased urine volume, dysuria, frequency, hematuria and urgency.   Skin:  Negative for rash, skin lesions and wound.   Neurological:  Negative for headache.       Past History:  Medical History: has a past medical history of Diabetes mellitus type 1.   Surgical History: has a past surgical history that includes Knee surgery (Right, 07/25/2022).   Family History: family history includes Diabetes type I in his mother.   Social History: reports that he has never smoked. He has quit using smokeless tobacco.  His smokeless tobacco use included chew. He reports that he does not currently use alcohol. He reports that he does not use drugs.    PHQ-2 Depression Screening  Little interest or pleasure in doing things?     Feeling down, depressed, or hopeless?     PHQ-2 Total Score          PHQ-9 Depression Screening  Little interest or pleasure in doing things?     Feeling down, depressed, or hopeless?     Trouble falling or staying asleep, or sleeping too much?     Feeling tired or having little energy?     Poor appetite or overeating?     Feeling bad about yourself - or that you are a failure or have let yourself or your family down?     Trouble concentrating on things, such as reading the newspaper or watching television?     Moving or speaking so slowly that other people could have noticed? Or the opposite -  being so fidgety or restless that you have been moving around a lot more than usual?     Thoughts that you would be better off dead, or of hurting yourself in some way?     PHQ-9 Total Score     If you checked off any problems, how difficult have these problems made it for you to do your work, take care of things at home, or get along with other people?       PHQ-9 Total Score:        Patient screened positive for depression based on a PHQ-9 score of 0 on 6/7/2024. Follow-up recommendations include:          Current Outpatient Medications:     atorvastatin (LIPITOR) 10 MG tablet, Take 1 tablet by mouth Daily., Disp: 90 tablet, Rfl: 1    Continuous Blood Gluc Sensor (Dexcom G6 Sensor), , Disp: , Rfl:     Continuous Glucose Transmitter (Dexcom G6 Transmitter) misc, Inject 1 Device under the skin into the appropriate area as directed Daily., Disp: 1 each, Rfl: 0    fluticasone (FLONASE) 50 MCG/ACT nasal spray, , Disp: , Rfl:     Insulin Lispro (humaLOG) 100 UNIT/ML injection, Inject 100 Units under the skin into the appropriate area as directed Daily for 180 days. Max daily dose of 100 units per insulin pump, Disp: 90 mL, Rfl: 1    losartan (Cozaar) 25 MG tablet, Take 1 tablet by mouth Daily., Disp: 30 tablet, Rfl: 11    montelukast (Singulair) 10 MG tablet, Take 1 tablet by mouth Every Night., Disp: 90 tablet, Rfl: 1    tadalafil (Cialis) 10 MG tablet, Take 1 tablet by mouth As Needed for Erectile Dysfunction., Disp: 20 tablet, Rfl: 1   (Not in a hospital admission)     Allergies: Penicillins and Levofloxacin    Physical Exam  Constitutional:       Appearance: Normal appearance.   Eyes:      Conjunctiva/sclera: Conjunctivae normal.      Pupils: Pupils are equal, round, and reactive to light.   Cardiovascular:      Rate and Rhythm: Normal rate and regular rhythm.      Heart sounds: Normal heart sounds.   Pulmonary:      Effort: Pulmonary effort is normal.      Breath sounds: Normal breath sounds.   Neurological:       General: No focal deficit present.      Mental Status: He is alert and oriented to person, place, and time. Mental status is at baseline.   Psychiatric:         Mood and Affect: Mood normal.         Behavior: Behavior normal.         Thought Content: Thought content normal.         Judgment: Judgment normal.        Physical Exam      Result Review :          Results               Assessment and Plan    Diagnoses and all orders for this visit:    1. Seasonal allergic rhinitis, unspecified trigger (Primary)  -     montelukast (Singulair) 10 MG tablet; Take 1 tablet by mouth Every Night.  Dispense: 90 tablet; Refill: 1    2. Primary hypertension    3. Mixed hyperlipidemia  -     Lipid Panel  -     atorvastatin (LIPITOR) 10 MG tablet; Take 1 tablet by mouth Daily.  Dispense: 90 tablet; Refill: 1    4. Type 1 diabetes mellitus with hyperglycemia  -     Hemoglobin A1c    5. Encounter for long-term (current) use of other medications  -     Comprehensive Metabolic Panel    6. Vitamin D deficiency  -     Vitamin D,25-Hydroxy    7. Proteinuria, unspecified type  -     POC Urinalysis Dipstick, Automated; Future      Assessment & Plan  1. Diabetes Mellitus.  His blood glucose levels have been sporadic due to issues with his insulin pump and dietary inconsistencies. He reports that his insulin pump has been malfunctioning, causing alarms and requiring frequent resets. He has not contacted the  regarding these issues. Blood work will be conducted to assess his A1c, liver function, and kidney function. A urine test will also be performed to check for blood and protein levels. The results will be forwarded to his endocrinologist. He is advised to continue his current medication regimen, including insulin, losartan, atorvastatin, Singulair, and Flonase. If the blood work indicates any issues, adjustments to his medication regimen may be necessary. He is encouraged to improve his diet and consider meal prepping to better  manage his blood glucose levels.  Check feet daily.  Annual eye exams encouraged.  Encouraged to keep close contact with his endocrinologist.    2. Hyperlipidemia.  He is currently taking atorvastatin for cholesterol management. Blood work will be conducted to assess his lipid profile. He is advised to continue his current medication regimen. Dietary improvements are recommended to help manage his cholesterol levels.    3. Allergic Rhinitis.  He reports significant sinus drainage especially during the fall and spring seasons. He is currently taking Singulair and using Flonase. He is advised to continue these medications and may add an over-the-counter allergy pill such as Claritin or Zyrtec in the morning for additional relief.  We discussed possibly getting back in with an allergist to discuss allergy injections but he denies at this time.  He will keep me updated on the status of this.    4. HTN  Continue current medications.  Goal blood pressure less than 140/90.  Let me know if gets to or above that.  Risk discussed understood.  Education provided.  Return to clinic or ED with any issues or concerns.                      Follow Up   Return in about 3 months (around 12/13/2024), or if symptoms worsen or fail to improve.  Patient was given instructions and counseling regarding his condition or for health maintenance advice. Please see specific information pulled into the AVS if appropriate.     Patient or patient representative verbalized consent for the use of Ambient Listening during the visit with  CIARA Richey for chart documentation. 9/13/2024  11:44 EDT    CIARA Richey

## 2024-09-14 LAB
25(OH)D3+25(OH)D2 SERPL-MCNC: 37.8 NG/ML (ref 30–100)
ALBUMIN SERPL-MCNC: 4.6 G/DL (ref 4.3–5.2)
ALP SERPL-CCNC: 109 IU/L (ref 44–121)
ALT SERPL-CCNC: 25 IU/L (ref 0–44)
AST SERPL-CCNC: 20 IU/L (ref 0–40)
BILIRUB SERPL-MCNC: 0.4 MG/DL (ref 0–1.2)
BUN SERPL-MCNC: 17 MG/DL (ref 6–20)
BUN/CREAT SERPL: 19 (ref 9–20)
CALCIUM SERPL-MCNC: 10.6 MG/DL (ref 8.7–10.2)
CHLORIDE SERPL-SCNC: 95 MMOL/L (ref 96–106)
CHOLEST SERPL-MCNC: 231 MG/DL (ref 100–199)
CO2 SERPL-SCNC: 28 MMOL/L (ref 20–29)
CREAT SERPL-MCNC: 0.89 MG/DL (ref 0.76–1.27)
EGFRCR SERPLBLD CKD-EPI 2021: 118 ML/MIN/1.73
GLOBULIN SER CALC-MCNC: 2.6 G/DL (ref 1.5–4.5)
GLUCOSE SERPL-MCNC: 278 MG/DL (ref 70–99)
HBA1C MFR BLD: 9.6 % (ref 4.8–5.6)
HDLC SERPL-MCNC: 86 MG/DL
LDLC SERPL CALC-MCNC: 119 MG/DL (ref 0–99)
POTASSIUM SERPL-SCNC: 4.6 MMOL/L (ref 3.5–5.2)
PROT SERPL-MCNC: 7.2 G/DL (ref 6–8.5)
SODIUM SERPL-SCNC: 140 MMOL/L (ref 134–144)
TRIGL SERPL-MCNC: 152 MG/DL (ref 0–149)
VLDLC SERPL CALC-MCNC: 26 MG/DL (ref 5–40)

## 2024-09-15 LAB
BACTERIA UR CULT: NO GROWTH
BACTERIA UR CULT: NORMAL

## 2024-09-17 DIAGNOSIS — E10.65 UNCONTROLLED TYPE 1 DIABETES MELLITUS WITH HYPERGLYCEMIA, WITH LONG-TERM CURRENT USE OF INSULIN: ICD-10-CM

## 2024-09-18 RX ORDER — INSULIN LISPRO 100 [IU]/ML
100 INJECTION, SOLUTION INTRAVENOUS; SUBCUTANEOUS DAILY
Qty: 90 ML | Refills: 1 | OUTPATIENT
Start: 2024-09-18 | End: 2025-03-17

## 2024-09-20 RX ORDER — PROCHLORPERAZINE 25 MG/1
SUPPOSITORY RECTAL
Qty: 1 EACH | Refills: 0 | Status: SHIPPED | OUTPATIENT
Start: 2024-09-20

## 2024-10-07 ENCOUNTER — TELEPHONE (OUTPATIENT)
Dept: DIABETES SERVICES | Facility: CLINIC | Age: 29
End: 2024-10-07
Payer: MEDICAID

## 2024-10-07 NOTE — TELEPHONE ENCOUNTER
Attempted to contact patient regarding updating health insurance.  Patient is scheduled to see Brian Esparza on 10/8/2024 and doesn't have any active insurance.  No answer on patient's phone.  Left voicemail for patient to contact office back to update insurance.

## 2024-11-13 NOTE — TELEPHONE ENCOUNTER
"Chief Complaint  Cough, Fatigue, and Nasal Congestion    Subjective        Lisbeth Perdue presents to [facility]  History of Present Illness    History of Present Illness  The patient is a 63-year-old female presenting today for cough, fatigue, and nasal congestion.    Her symptoms began around Halloween, approximately two weeks ago, after being in contact with her grandson who was unwell and coughing. Initially, she suspected her symptoms were due to reflux as she had a dry cough. She discontinued her oxybutynin medication, suspecting it might be the cause. Despite this, her cough persisted, leading her to seek medical attention at an urgent care facility. There, she was informed that her lungs and heart sounded clear. She was prescribed Tessalon Perles for her cough, which she took at night and found helpful.    However, her fatigue worsened over time. Her cough also worsened, changing from a dry cough to a deep chest cough. She has been experiencing hoarseness throughout this period, often waking up without a voice. She reports no fever. She underwent a CT calcium score test today.    ALLERGIES  She has no known allergies to antibiotics.    Objective   Vital Signs:  /80   Pulse 83   Temp 98.6 °F (37 °C) (Temporal)   Resp 18   Ht 165.1 cm (65\")   Wt 59.1 kg (130 lb 6 oz)   SpO2 98%   BMI 21.70 kg/m²   Estimated body mass index is 21.7 kg/m² as calculated from the following:    Height as of this encounter: 165.1 cm (65\").    Weight as of this encounter: 59.1 kg (130 lb 6 oz).    BMI is within normal parameters. No other follow-up for BMI required.      The following portions of the patient's history were reviewed and updated as appropriate: allergies, current medications, past family history, past medical history, past social history, past surgical history, and problem list.       Physical Exam  Vitals reviewed.   Constitutional:       General: She is not in " Pt scheduled for July 22 at 3pm and contacted. Wilmer notified as well.   acute distress.     Appearance: She is ill-appearing. She is not toxic-appearing or diaphoretic.   HENT:      Right Ear: Tympanic membrane and ear canal normal.      Left Ear: Tympanic membrane and ear canal normal.      Nose: Congestion present. No rhinorrhea.      Right Turbinates: Swollen.      Left Turbinates: Swollen.      Mouth/Throat:      Mouth: No oral lesions.      Pharynx: No pharyngeal swelling, oropharyngeal exudate, posterior oropharyngeal erythema or uvula swelling.   Cardiovascular:      Rate and Rhythm: Normal rate and regular rhythm.   Pulmonary:      Effort: Pulmonary effort is normal.      Breath sounds: Examination of the right-lower field reveals rhonchi. Examination of the left-lower field reveals rhonchi. Rhonchi present. No wheezing.   Neurological:      Mental Status: She is alert.        Result Review :              UC with Teresa Cook APRN (11/05/2024)     Assessment and Plan   Diagnoses and all orders for this visit:    1. Acute lower respiratory infection (Primary)  -     azithromycin (ZITHROMAX) 250 MG tablet; Take 2 tablets the first day, then 1 tablet daily for 4 days.  Dispense: 6 tablet; Refill: 0             Assessment & Plan  1. Lower respiratory tract infection.  She presents with a cough, fatigue, nasal congestion, and hoarseness for about 2 weeks. Initially thought to be reflux, but symptoms worsened, and a deep chest cough developed. Examination revealed rhonchi in the chest, indicating a lower respiratory tract infection. A prescription for Z-Rosendo was provided. She was advised to continue taking Tessalon Perles three times daily to manage her cough. Over-the-counter Mucinex DM or Delsym was recommended for additional relief.  If she is not improved by next week recommend changing the antibiotic to Augmentin.        Follow Up   Return if symptoms worsen or fail to improve.  Patient was given instructions and counseling regarding her condition or for health maintenance  advice. Please see specific information pulled into the AVS if appropriate.         Patient or patient representative verbalized consent for the use of Ambient Listening during the visit with  Payton Carey MD for chart documentation. 11/13/2024  11:50 EST    Electronically signed by Payton Carey MD, 11/13/24, 11:51 AM EST.

## 2024-11-23 DIAGNOSIS — E10.65 UNCONTROLLED TYPE 1 DIABETES MELLITUS WITH HYPERGLYCEMIA, WITH LONG-TERM CURRENT USE OF INSULIN: ICD-10-CM

## 2024-11-25 ENCOUNTER — TELEPHONE (OUTPATIENT)
Dept: FAMILY MEDICINE CLINIC | Facility: CLINIC | Age: 29
End: 2024-11-25

## 2024-11-25 RX ORDER — INSULIN LISPRO 100 [IU]/ML
INJECTION, SOLUTION INTRAVENOUS; SUBCUTANEOUS
Qty: 20 ML | Refills: 0 | OUTPATIENT
Start: 2024-11-25

## 2024-11-25 NOTE — TELEPHONE ENCOUNTER
Caller: [facility] [address] [place] [address] - [phone] [facility] [phone]    Relationship: Pharmacy    Best call back number: [phone]     Which medication are you concerned about: Insulin Lispro (humaLOG) 100 UNIT/ML injection         What are your concerns: PATIENT'S INSURANCE WILL NOT COVER THIS MEDICATION. PLEASE PRESCRIBE SOMETHING ELSE. THEY WILL COVER NOVOLOG. PLEASE CONTACT THE PHARMACY WITH THE NEW PRESCRIPTION

## 2024-11-26 ENCOUNTER — TELEPHONE (OUTPATIENT)
Dept: DIABETES SERVICES | Facility: HOSPITAL | Age: 29
End: 2024-11-26
Payer: MEDICAID

## 2024-11-26 DIAGNOSIS — E10.65 UNCONTROLLED TYPE 1 DIABETES MELLITUS WITH HYPERGLYCEMIA, WITH LONG-TERM CURRENT USE OF INSULIN: Primary | ICD-10-CM

## 2024-11-26 RX ORDER — INSULIN ASPART 100 [IU]/ML
INJECTION, SOLUTION INTRAVENOUS; SUBCUTANEOUS
Qty: 30 ML | Refills: 0 | Status: SHIPPED | OUTPATIENT
Start: 2024-11-26

## 2024-11-26 NOTE — TELEPHONE ENCOUNTER
Pharmacy Name:  [facility] [address] [place] [address]     Reference Number (if applicable):     Pharmacy representative name: N/A    Pharmacy representative phone number: [phone]     What medication are you calling in regards to: HUMALOG     What question does the pharmacy have: INSURANCE PREFERS NOVOLOG AND NEEDS PERMISSION TO SWITCH     Who is the provider that prescribed the medication: Brian Esparza, APRN     Additional notes:

## 2024-11-26 NOTE — TELEPHONE ENCOUNTER
Pt made aware of RX and importance of following up with the provider to continue to manage diabetes. Pt verbalized understanding.

## 2024-11-26 NOTE — TELEPHONE ENCOUNTER
Spoke with [facility] pharmacy. They will call Dr. Esparza's office for change of medication.

## 2024-12-13 NOTE — PROGRESS NOTES
"Chief Complaint  Diabetes (Follow up, med mgt, A1c eval, pump eval )    Referred By: No ref. provider found    Subjective          Parth Bright presents to [facility] for insulin pump management    History of Present Illness    Visit type:  follow-up  Diabetes type:  Type 1  Current diabetes status/concerns/issues:  Recent hospitalization for DKA due to being out of insulin due to loss of insurance  Other health concerns: No new health concerns  Current Diabetes symptoms:    Polyuria: Yes \"when blood glucose elevated\"   Polydipsia: Yes \"when blood glucose elevated\"   Polyphagia: No   Blurred vision: Yes \"when blood glucose elevated\"   Excessive fatigue: No  Known Diabetes complications:  Neuropathy: Numbness and Shooting Pain; Location: Feet, Hands, and Bilateral  Renal: No current urine microalbumin available and Normal eGFR per current labs  Eyes: Moderate Nonproliferative Retinopathy and With Macular Edema; Location: Bilateral  Amputation/Wounds: None  GI: Reflux and Indigestion  Cardiovascular: Hyperlipidemia and Hypertriglyceridemia  ED: Patient Reported  Other: None  Hypoglycemia:  None reported at this time  Hypoglycemia Symptoms:  No hypoglycemia at this time  Current diabetes treatment:  NovoLog per tandem t:slim X2  Blood glucose device:  Dexcom CGM  Blood glucose monitoring frequency:  Continuous per CGM  Blood glucose range/average:   Unable to download CGM report due to technical difficulties  Glucose Source: Device Reviewed  Diet:  Limits high carb/sweet foods, Avoids sugary drinks  Activity/Exercise:   Active outdoors    Past Medical History:   Diagnosis Date    Diabetes mellitus type 1      Past Surgical History:   Procedure Laterality Date    KNEE SURGERY Right 07/25/2022     Family History   Problem Relation Age of Onset    Diabetes type I Mother      Social History     Socioeconomic History    Marital status:    Tobacco Use    Smoking status: Never    " "Smokeless tobacco: Former     Types: Chew   Vaping Use    Vaping status: Some Days    Substances: Nicotine   Substance and Sexual Activity    Alcohol use: Not Currently     Comment: occasionally on the weekends    Drug use: Never    Sexual activity: Defer     Allergies   Allergen Reactions    Penicillins Shortness Of Breath    Levofloxacin Other (See Comments)     KIDNEY FAILURE       Current Outpatient Medications:     atorvastatin (LIPITOR) 10 MG tablet, Take 1 tablet by mouth Daily., Disp: 90 tablet, Rfl: 1    Continuous Blood Gluc Sensor (Dexcom G6 Sensor), , Disp: , Rfl:     Continuous Glucose Transmitter (Dexcom G6 Transmitter) misc, INJECT 1 DEVICE UNDER THE SKIN INTO THE APPROPRIATE AREA AND USE AS DIRECTED DAILY, Disp: 1 each, Rfl: 0    fluticasone (FLONASE) 50 MCG/ACT nasal spray, , Disp: , Rfl:     Insulin Aspart (NovoLOG) 100 UNIT/ML injection, 100 units per day per insulin pump., Disp: 30 mL, Rfl: 2    losartan (Cozaar) 25 MG tablet, Take 1 tablet by mouth Daily for 180 days., Disp: 90 tablet, Rfl: 1    montelukast (Singulair) 10 MG tablet, Take 1 tablet by mouth Every Night., Disp: 90 tablet, Rfl: 1    tadalafil (Cialis) 10 MG tablet, Take 1 tablet by mouth As Needed for Erectile Dysfunction., Disp: 20 tablet, Rfl: 1    Objective     Vitals:    12/17/24 1036   BP: 138/97   BP Location: Right arm   Patient Position: Sitting   Cuff Size: Large Adult   Pulse: 99   Temp: 98.1 °F (36.7 °C)   TempSrc: Oral   SpO2: 100%   Weight: 97.5 kg (215 lb)   Height: 182.9 cm (72.01\")     Body mass index is 29.15 kg/m².    Physical Exam  Constitutional:       Appearance: Normal appearance.      Comments: Overweight (BMI 25 - 29.9) Pt Current BMI = 29.15      HENT:      Head: Normocephalic and atraumatic.      Right Ear: External ear normal.      Left Ear: External ear normal.      Nose: Nose normal.   Eyes:      Extraocular Movements: Extraocular movements intact.      Conjunctiva/sclera: Conjunctivae normal. "   Pulmonary:      Effort: Pulmonary effort is normal.   Musculoskeletal:         General: Normal range of motion.      Cervical back: Normal range of motion.   Skin:     General: Skin is warm and dry.   Neurological:      General: No focal deficit present.      Mental Status: He is alert and oriented to person, place, and time. Mental status is at baseline.   Psychiatric:         Mood and Affect: Mood normal.         Behavior: Behavior normal.         Thought Content: Thought content normal.         Judgment: Judgment normal.         Result Review :   The following data was reviewed by: CIARA Becerra on 12/17/2024:    Most Recent A1C          12/17/2024    10:39   HGBA1C Most Recent   Hemoglobin A1C 8.8        A1C Last 3 Results          7/22/2024    15:23 9/13/2024    11:48 12/17/2024    10:39   HGBA1C Last 3 Results   Hemoglobin A1C 9.4  9.6  8.8      A1c collected in the office today is 8.8%, indicating Uncontrolled Type I diabetes.  This result is down from the prior result of 9.6% collected on 9/13/2024    Glucose   Date Value Ref Range Status   07/22/2024 491 (C) 70 - 99 mg/dL Final     Comment:     Serial Number: [id number]:  [id number]   08/30/2022 154 (H) 71 - 139 mg/dL Final     Comment:     H   07/25/2022 237 (H) 74 - 99 mg/dL Final     Comment:     Accuracy of a glucose result obtained from a capillary whole blood specimen relies upon adequate, non-compromised capillary blood flow.  If the capillary glucose result is not consistent with the patient's clinical signs and symptoms, glucose testing should be repeated with either an arterial or venous sample on the glucometer or sent to the main labortory for testing.     Creatinine   Date Value Ref Range Status   09/13/2024 0.89 0.76 - 1.27 mg/dL Final   06/28/2024 0.77 0.76 - 1.27 mg/dL Final   08/29/2022 0.88 0.73 - 1.18 mg/dL Final   08/29/2022 0.83 0.73 - 1.18 mg/dL Final     Creatinine, Urine   Date Value Ref Range Status   08/16/2016  203.4 mg/dL Final     Total Cholesterol   Date Value Ref Range Status   08/16/2016 112 0 - 200 mg/dL Final     Triglycerides   Date Value Ref Range Status   09/13/2024 152 (H) 0 - 149 mg/dL Final   06/07/2024 76 0 - 149 mg/dL Final   08/16/2016 66 0 - 150 mg/dL Final     HDL Cholesterol   Date Value Ref Range Status   09/13/2024 86 >39 mg/dL Final   06/07/2024 87 >39 mg/dL Final   08/16/2016 53 40 - 60 mg/dL Final     LDL Chol Calc (NIH)   Date Value Ref Range Status   09/13/2024 119 (H) 0 - 99 mg/dL Final   06/07/2024 122 (H) 0 - 99 mg/dL Final     Lipid panel collected on 9/13/2024 shows Hyperlipidemia and Hypertriglyceridemia            Assessment: Patient presents for insulin pump management after not being seen in clinic for approximately 5 months.  Patient states that he was recently hospitalized for DKA due to not having insulin after insurance was canceled.  Unable to download insulin pump report due to technical difficulties.  No changes were made to the insulin pump settings from his prior visit.  Patient has experienced a reduction in his A1c from 9.6% collected on 913 2024 to 8.8% collected in the office today.  Patient states that he has started a new job, is active in the field again, and blood glucose values have improved significantly.      Diagnoses and all orders for this visit:    1. Uncontrolled type 1 diabetes mellitus with hyperglycemia, with long-term current use of insulin (Primary)  -     Microalbumin / Creatinine Urine Ratio - Urine, Clean Catch; Future  -     POC Glycosylated Hemoglobin (Hb A1C)  -     Insulin Aspart (NovoLOG) 100 UNIT/ML injection; 100 units per day per insulin pump.  Dispense: 30 mL; Refill: 2    2. Primary hypertension  -     losartan (Cozaar) 25 MG tablet; Take 1 tablet by mouth Daily for 180 days.  Dispense: 90 tablet; Refill: 1    3. Mixed hyperlipidemia  -     atorvastatin (LIPITOR) 10 MG tablet; Take 1 tablet by mouth Daily.  Dispense: 90 tablet; Refill: 1    4.  Erectile dysfunction due to type 1 diabetes mellitus  -     tadalafil (Cialis) 10 MG tablet; Take 1 tablet by mouth As Needed for Erectile Dysfunction.  Dispense: 20 tablet; Refill: 1        Plan: No changes were made to the insulin pump settings.  Patient is encouraged to need to focus on bolusing prior to each meal, and accurate carb counting.  Patient is encouraged to focus on his diet and physical activity to assist with improvement in glycemic control and weight management.  Refills of all medications were sent to pharmacy.  Patient is scheduled for follow-up in 2 months, an A1c will be collected, and CGM report reviewed.    The patient will monitor his blood glucose levels per CGM.  If he develops problematic hyperglycemia or hypoglycemia or adverse drug reactions, he will contact the office for further instructions.        Follow Up     No follow-ups on file.    Patient was given instructions and counseling regarding his condition or for health maintenance advice. Please see specific information pulled into the AVS if appropriate.     Brian Esparza, APRN  12/17/2024      Dictated Utilizing Dragon Dictation.  Please note that portions of this note were completed with a voice recognition program.  Part of this note may be an electronic transcription/translation of spoken language to printed text using the Dragon Dictation System.

## 2024-12-16 ENCOUNTER — TELEPHONE (OUTPATIENT)
Dept: DIABETES SERVICES | Facility: CLINIC | Age: 29
End: 2024-12-16
Payer: COMMERCIAL

## 2024-12-16 NOTE — TELEPHONE ENCOUNTER
Patient called office back, stated he does have insurance - [place] PPO policy.  He didn't have card on him but he will bring to his appointment on Tuesday.  wnt

## 2024-12-16 NOTE — TELEPHONE ENCOUNTER
Attempted to reach patient to get updated insurance information.  Patients current insurance - [employer]- is no longer active.  I checked [place] Medicaid website, they do not show patient listed in their system at all- I searched by medicaid ID as well as SSN and .  Left patient voicemail to have him call back to give updated insurance for his appointment tomorrow 2024 with Brian Esparza in [place].  wnt

## 2024-12-17 ENCOUNTER — OFFICE VISIT (OUTPATIENT)
Dept: DIABETES SERVICES | Facility: CLINIC | Age: 29
End: 2024-12-17
Payer: COMMERCIAL

## 2024-12-17 VITALS
HEIGHT: 72 IN | BODY MASS INDEX: 29.12 KG/M2 | OXYGEN SATURATION: 100 % | WEIGHT: 215 LBS | DIASTOLIC BLOOD PRESSURE: 97 MMHG | HEART RATE: 99 BPM | TEMPERATURE: 98.1 F | SYSTOLIC BLOOD PRESSURE: 138 MMHG

## 2024-12-17 DIAGNOSIS — N52.9 ERECTILE DYSFUNCTION DUE TO TYPE 1 DIABETES MELLITUS: ICD-10-CM

## 2024-12-17 DIAGNOSIS — E10.69 ERECTILE DYSFUNCTION DUE TO TYPE 1 DIABETES MELLITUS: ICD-10-CM

## 2024-12-17 DIAGNOSIS — E10.65 UNCONTROLLED TYPE 1 DIABETES MELLITUS WITH HYPERGLYCEMIA, WITH LONG-TERM CURRENT USE OF INSULIN: Primary | ICD-10-CM

## 2024-12-17 DIAGNOSIS — I10 PRIMARY HYPERTENSION: ICD-10-CM

## 2024-12-17 DIAGNOSIS — E78.2 MIXED HYPERLIPIDEMIA: ICD-10-CM

## 2024-12-17 LAB
EXPIRATION DATE: ABNORMAL
HBA1C MFR BLD: 8.8 % (ref 4.5–5.7)
Lab: ABNORMAL

## 2024-12-17 PROCEDURE — 99214 OFFICE O/P EST MOD 30 MIN: CPT

## 2024-12-17 RX ORDER — TADALAFIL 10 MG/1
10 TABLET ORAL AS NEEDED
Qty: 20 TABLET | Refills: 1 | Status: SHIPPED | OUTPATIENT
Start: 2024-12-17 | End: 2025-12-17

## 2024-12-17 RX ORDER — LOSARTAN POTASSIUM 25 MG/1
25 TABLET ORAL DAILY
Qty: 90 TABLET | Refills: 1 | Status: SHIPPED | OUTPATIENT
Start: 2024-12-17 | End: 2025-06-15

## 2024-12-17 RX ORDER — INSULIN ASPART 100 [IU]/ML
INJECTION, SOLUTION INTRAVENOUS; SUBCUTANEOUS
Qty: 30 ML | Refills: 2 | Status: SHIPPED | OUTPATIENT
Start: 2024-12-17

## 2024-12-17 RX ORDER — ATORVASTATIN CALCIUM 10 MG/1
10 TABLET, FILM COATED ORAL DAILY
Qty: 90 TABLET | Refills: 1 | Status: SHIPPED | OUTPATIENT
Start: 2024-12-17

## 2025-02-23 NOTE — PROGRESS NOTES
Chief Complaint  Diabetes (Follo wup, med mgt, A1c eval, pump eval , )    Referred By: No ref. provider found    Subjective          Patient or patient representative verbalized consent for the use of Ambient Listening during the visit with  CIARA Becerra for chart documentation. 2/25/2025  08:54 SAM Bright presents to [facility] for insulin pump management    History of Present Illness    History of Present Illness  The patient presents for evaluation of diabetes.    He is currently experiencing a transition period due to a change in employment, which has resulted in the loss of his insurance coverage. He anticipates starting a new job at the end of next month, which will provide him with better benefits, but he must wait 30 days from the start date for these to take effect. His current work schedule is demanding, requiring him to work between 12 to 16 hours daily. He reports that his Dexcom sensor is nearing the end of its lifespan and will need replacement soon. He has been informed by [email or web address] that he can select his desired items, but he will be responsible for the full cost as his deductible is $ 1500. He uses his pump to manage his CGM and start the sensor. He recalls a time when he was diligent about meal preparation, but his current living situation, which includes his fiancée and her children, has disrupted his routine. He often resorts to fast food due to time constraints and struggles to maintain a consistent diet. He has experienced episodes of hypoglycemia, with blood sugar levels dropping to 70 when he is busy at work. He is still struggling to get into a good routine. He hopes that his new job, which offers a more predictable schedule, will allow him to establish a healthier routine. He has been using his current pump for approximately 4 to 5 years and has encountered several issues, including lockups and error messages. He has had to reset  the pump multiple times. He has also run out of insulin on a few occasions, necessitating trips to the pharmacy. He has an eye appointment scheduled for 03/19/2025. He reports that his left eye has worsened over the past few months, with a near-central blind spot developing. 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 He has noticed that his vision has deteriorated since he started his current work schedule. He has been managing his diabetes independently, including making adjustments to his pump settings. His goal is to keep his blood sugar levels below 200.    MEDICATIONS  insulin      Visit type:  follow-up  Diabetes type:  Type 1  Current diabetes status/concerns/issues:  No concerns  Other health concerns: No new health concerns  Current Diabetes symptoms:    Polyuria: Yes     Polydipsia: Yes     Polyphagia: No   Blurred vision: Yes     Excessive fatigue: No  Known Diabetes complications:  Neuropathy: Numbness and Shooting Pain; Location: Feet, Hands, and Bilateral  Renal: No current eGFR available and No current urine microalbumin available  Eyes: Moderate Nonproliferative Retinopathy and With Macular Edema; Location: Bilateral  Amputation/Wounds: None  GI: Reflux and Indigestion  Cardiovascular: Hyperlipidemia and Hypertriglyceridemia  ED: Patient Reported  Other: None  Hypoglycemia:  None reported at this time  Hypoglycemia Symptoms:  No hypoglycemia at this time  Current diabetes treatment:  NovoLog via Tandem t:slim X2 insulin pump  Blood glucose device:  Dexcom CGM  Blood glucose monitoring frequency:  Continuous per CGM  Blood glucose range/average:  The 14-day sensor report shows an average glucose of 217 mg/dL, with 39% in target range ( mgdL), 32% in the high range (181-250 mg/dL), 30% in the very high range (>250 mg/dL), 0% in the low range (54-70 mg/dL) and 0% in the very low range (<54 mg/dL).   Glucose Source: Device  "Reviewed  Diet:  Limits high carb/sweet foods, Avoids sugary drinks  Activity/Exercise:   Active outdoors    Past Medical History:   Diagnosis Date    Diabetes mellitus type 1      Past Surgical History:   Procedure Laterality Date    KNEE SURGERY Right 07/25/2022     Family History   Problem Relation Age of Onset    Diabetes type I Mother      Social History     Socioeconomic History    Marital status:    Tobacco Use    Smoking status: Never    Smokeless tobacco: Former     Types: Chew   Vaping Use    Vaping status: Some Days    Substances: Nicotine   Substance and Sexual Activity    Alcohol use: Not Currently     Comment: occasionally on the weekends    Drug use: Never    Sexual activity: Defer     Allergies   Allergen Reactions    Penicillins Shortness Of Breath    Levofloxacin Other (See Comments)     KIDNEY FAILURE       Current Outpatient Medications:     atorvastatin (LIPITOR) 10 MG tablet, Take 1 tablet by mouth Daily., Disp: 90 tablet, Rfl: 1    Continuous Glucose Sensor (Dexcom G6 Sensor), Use Every 10 (Ten) Days for 180 days., Disp: 3 each, Rfl: 5    Continuous Glucose Transmitter (Dexcom G6 Transmitter) misc, Use 1 each Every 3 (Three) Months., Disp: 1 each, Rfl: 1    fluticasone (FLONASE) 50 MCG/ACT nasal spray, , Disp: , Rfl:     Insulin Aspart (NovoLOG) 100 UNIT/ML injection, 100 units per day per insulin pump., Disp: 30 mL, Rfl: 2    losartan (Cozaar) 25 MG tablet, Take 1 tablet by mouth Daily for 180 days., Disp: 90 tablet, Rfl: 1    montelukast (Singulair) 10 MG tablet, Take 1 tablet by mouth Every Night., Disp: 90 tablet, Rfl: 1    tadalafil (Cialis) 10 MG tablet, Take 1 tablet by mouth As Needed for Erectile Dysfunction., Disp: 20 tablet, Rfl: 1    Objective     Vitals:    02/25/25 0850   BP: 143/92   BP Location: Right arm   Patient Position: Sitting   Cuff Size: Adult   Pulse: 82   SpO2: 100%   Weight: 100 kg (221 lb 1.6 oz)   Height: 182.9 cm (72.01\")     Body mass index is 29.98 " kg/m².    Physical Exam  Constitutional:       Appearance: Normal appearance.      Comments: Overweight (BMI 25 - 29.9) Pt Current BMI = 29.98      HENT:      Head: Normocephalic and atraumatic.      Right Ear: External ear normal.      Left Ear: External ear normal.      Nose: Nose normal.   Eyes:      Extraocular Movements: Extraocular movements intact.      Conjunctiva/sclera: Conjunctivae normal.   Pulmonary:      Effort: Pulmonary effort is normal.   Musculoskeletal:         General: Normal range of motion.      Cervical back: Normal range of motion.   Skin:     General: Skin is warm and dry.   Neurological:      General: No focal deficit present.      Mental Status: He is alert and oriented to person, place, and time. Mental status is at baseline.   Psychiatric:         Mood and Affect: Mood normal.         Behavior: Behavior normal.         Thought Content: Thought content normal.         Judgment: Judgment normal.         Result Review :   The following data was reviewed by: CIARA Becerra on 02/11/2025:    Most Recent A1C          2/25/2025    08:55   HGBA1C Most Recent   Hemoglobin A1C 9.0        A1C Last 3 Results          9/13/2024    11:48 12/17/2024    10:39 2/25/2025    08:55   HGBA1C Last 3 Results   Hemoglobin A1C 9.6  8.8  9.0      A1c collected in the office today is 9.0%, indicating Uncontrolled Type I diabetes.  This result is up from the prior result of 8.8% collected on 12/17/2024    Glucose   Date Value Ref Range Status   07/22/2024 491 (C) 70 - 99 mg/dL Final     Comment:     Serial Number: [id number]:  [id number]     Creatinine   Date Value Ref Range Status   09/13/2024 0.89 0.76 - 1.27 mg/dL Final   06/28/2024 0.77 0.76 - 1.27 mg/dL Final   117  Creatinine, Urine   Date Value Ref Range Status   08/16/2016 203.4 mg/dL Final     Total Cholesterol   Date Value Ref Range Status   08/16/2016 112 0 - 200 mg/dL Final     Triglycerides   Date Value Ref Range Status   09/13/2024 152  (H) 0 - 149 mg/dL Final   06/07/2024 76 0 - 149 mg/dL Final     HDL Cholesterol   Date Value Ref Range Status   09/13/2024 86 >39 mg/dL Final   06/07/2024 87 >39 mg/dL Final     LDL Chol Calc (NIH)   Date Value Ref Range Status   09/13/2024 119 (H) 0 - 99 mg/dL Final   06/07/2024 122 (H) 0 - 99 mg/dL Final     Lipid panel collected on 9/13/2024 shows Hyperlipidemia and Hypertriglyceridemia            Diagnoses and all orders for this visit:    1. Type 1 diabetes mellitus with hyperglycemia (Primary)  -     POC Glycosylated Hemoglobin (Hb A1C)  -     Insulin Aspart (NovoLOG) 100 UNIT/ML injection; 100 units per day per insulin pump.  Dispense: 30 mL; Refill: 2  -     Continuous Glucose Transmitter (Dexcom G6 Transmitter) misc; Use 1 each Every 3 (Three) Months.  Dispense: 1 each; Refill: 1  -     Continuous Glucose Sensor (Dexcom G6 Sensor); Use Every 10 (Ten) Days for 180 days.  Dispense: 3 each; Refill: 5    2. Type 1 diabetes mellitus with diabetic polyneuropathy    3. Insulin pump titration    4. Primary hypertension    5. Mixed hyperlipidemia    6. Erectile dysfunction due to type 1 diabetes mellitus    7. Uncontrolled type 1 diabetes mellitus with hyperglycemia, with long-term current use of insulin        Assessment & Plan  1. Diabetes Mellitus.  His A1c levels have shown a slight increase from 8.8 to 9%. The CGM report indicates an average blood glucose level of 217 mg/dL, with a time in range of 39 percent. He experiences high blood glucose levels (181 to 250 mg/dL) 32 percent of the time and very high levels (greater than 250 mg/dL) 30 percent of the time. There are no recorded low blood glucose levels. His sleep schedule is set in his pump, which needs to be adjusted due to his changing work schedule. 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 He is also encouraged to maintain a consistent diet and routine, and to bolus before  meals. He is advised to contact the office if he encounters any issues. A prescription for a 90-day supply of insulin will be provided. An order for a new pump will be sent to James, who will contact him after verifying his insurance coverage. The sleep mode on his current pump will be deactivated. The maximum basal rate will be increased to 4.8 units.  His correction factor is intensified from 1 unit of insulin for every 35 mg/dL greater than 110 mg/dL to 1 unit for every 30 mg/dL greater than 110 mg/dL    Follow-up  The patient will follow up in 3 months.    The patient will monitor his blood glucose levels per continuous glucose monitor.  If he develops problematic hyperglycemia or hypoglycemia or adverse drug reactions, he will contact the office for further instructions.        Follow Up     Return in about 3 months (around 5/25/2025) for CGM Follow-Up, Insulin Pump Follow-Up, Medication Management.    Patient was given instructions and counseling regarding his condition or for health maintenance advice. Please see specific information pulled into the AVS if appropriate.     Brian Esparza, CIARA  02/25/2025    Dictated Utilizing Dragon Dictation.  Please note that portions of this note were completed with a voice recognition program.  Part of this note may be an electronic transcription/translation of spoken language to printed text using the Dragon Dictation System.

## 2025-02-25 ENCOUNTER — OFFICE VISIT (OUTPATIENT)
Dept: DIABETES SERVICES | Facility: CLINIC | Age: 30
End: 2025-02-25
Payer: COMMERCIAL

## 2025-02-25 VITALS
BODY MASS INDEX: 29.95 KG/M2 | HEIGHT: 72 IN | SYSTOLIC BLOOD PRESSURE: 143 MMHG | OXYGEN SATURATION: 100 % | DIASTOLIC BLOOD PRESSURE: 92 MMHG | HEART RATE: 82 BPM | WEIGHT: 221.1 LBS

## 2025-02-25 DIAGNOSIS — E10.65 UNCONTROLLED TYPE 1 DIABETES MELLITUS WITH HYPERGLYCEMIA, WITH LONG-TERM CURRENT USE OF INSULIN: ICD-10-CM

## 2025-02-25 DIAGNOSIS — E78.2 MIXED HYPERLIPIDEMIA: ICD-10-CM

## 2025-02-25 DIAGNOSIS — N52.9 ERECTILE DYSFUNCTION DUE TO TYPE 1 DIABETES MELLITUS: ICD-10-CM

## 2025-02-25 DIAGNOSIS — E10.42 TYPE 1 DIABETES MELLITUS WITH DIABETIC POLYNEUROPATHY: ICD-10-CM

## 2025-02-25 DIAGNOSIS — I10 PRIMARY HYPERTENSION: ICD-10-CM

## 2025-02-25 DIAGNOSIS — E10.69 ERECTILE DYSFUNCTION DUE TO TYPE 1 DIABETES MELLITUS: ICD-10-CM

## 2025-02-25 DIAGNOSIS — E10.65 TYPE 1 DIABETES MELLITUS WITH HYPERGLYCEMIA: Primary | ICD-10-CM

## 2025-02-25 DIAGNOSIS — Z46.81 INSULIN PUMP TITRATION: ICD-10-CM

## 2025-02-25 LAB
EXPIRATION DATE: ABNORMAL
HBA1C MFR BLD: 9 % (ref 4.5–5.7)
Lab: ABNORMAL

## 2025-02-25 PROCEDURE — 95251 CONT GLUC MNTR ANALYSIS I&R: CPT

## 2025-02-25 PROCEDURE — 99214 OFFICE O/P EST MOD 30 MIN: CPT

## 2025-02-25 RX ORDER — PROCHLORPERAZINE 25 MG/1
1 SUPPOSITORY RECTAL
Qty: 1 EACH | Refills: 1 | Status: SHIPPED | OUTPATIENT
Start: 2025-02-25

## 2025-02-25 RX ORDER — PROCHLORPERAZINE 25 MG/1
SUPPOSITORY RECTAL
Qty: 3 EACH | Refills: 5 | Status: SHIPPED | OUTPATIENT
Start: 2025-02-25 | End: 2025-08-24

## 2025-02-25 RX ORDER — INSULIN ASPART 100 [IU]/ML
INJECTION, SOLUTION INTRAVENOUS; SUBCUTANEOUS
Qty: 30 ML | Refills: 2 | Status: SHIPPED | OUTPATIENT
Start: 2025-02-25

## 2025-03-26 ENCOUNTER — TELEPHONE (OUTPATIENT)
Dept: DIABETES SERVICES | Facility: HOSPITAL | Age: 30
End: 2025-03-26
Payer: COMMERCIAL

## 2025-03-26 DIAGNOSIS — E10.65 TYPE 1 DIABETES MELLITUS WITH HYPERGLYCEMIA: Primary | ICD-10-CM

## 2025-03-26 RX ORDER — INSULIN LISPRO 100 [IU]/ML
INJECTION, SOLUTION INTRAVENOUS; SUBCUTANEOUS
Qty: 30 ML | Refills: 5 | Status: SHIPPED | OUTPATIENT
Start: 2025-03-26

## 2025-03-26 NOTE — TELEPHONE ENCOUNTER
Last  02/25/2025    Next scheduled appt 06/03/2025    Patient called office because needing to know what Insulin is OTC at [facility].    Patient states he has gotten it before.    But he is without insurance right now.  Just picked up his Novolog but has been without X 3 days.  Supposed to be using 100u/day per pump.   BS running 500-600 without Novolog.    Wants to know if Carlos can call him and they can try to figure out what he needs to do until he gets his insurance.  States it will probably be End of April beginning of May before he will have his insurance back.    Patient just changed jobs and that is why he is without insurance right now.   Patient wants to know if you could call and talk to him about this.    He knows he needs his meds and wants to try to figure out how to get them.      # [phone]

## 2025-05-08 DIAGNOSIS — E10.65 TYPE 1 DIABETES MELLITUS WITH HYPERGLYCEMIA: ICD-10-CM

## 2025-05-08 RX ORDER — INSULIN ASPART 100 [IU]/ML
INJECTION, SOLUTION INTRAVENOUS; SUBCUTANEOUS
Qty: 30 ML | Refills: 2 | Status: SHIPPED | OUTPATIENT
Start: 2025-05-08

## 2025-05-08 NOTE — TELEPHONE ENCOUNTER
Caller: Parth Bright    Relationship to patient: Self    Best call back number: [phone]    Patient is needing: WANTING A CALLBACK - ALSO NEEDING ALL PRESCRIPTIONS CALLED IN

## 2025-05-13 DIAGNOSIS — E10.65 TYPE 1 DIABETES MELLITUS WITH HYPERGLYCEMIA: ICD-10-CM

## 2025-05-13 NOTE — TELEPHONE ENCOUNTER
Caller: Parth Bright    Relationship: Self    Best call back number: [phone]     Requested Prescriptions:   Requested Prescriptions     Pending Prescriptions Disp Refills    Continuous Glucose Sensor (Dexcom G6 Sensor) 3 each 5     Sig: Use Every 10 (Ten) Days for 180 days.    Continuous Glucose Transmitter (Dexcom G6 Transmitter) misc 1 each 1     Sig: Use 1 each Every 3 (Three) Months.    Insulin Aspart (NovoLOG) 100 UNIT/ML injection 30 mL 2     Si units per day per insulin pump.    Insulin Lispro (HumaLOG) 100 UNIT/ML injection 30 mL 5     Si units per day per insulin pump. Maximum daily dose of 100 units.        Pharmacy where request should be sent: [id number] - [address] AT [address] [phone] [facility] [phone] FX     Last office visit with prescribing clinician: 2025   Last telemedicine visit with prescribing clinician: Visit date not found   Next office visit with prescribing clinician: 2025     Additional details provided by patient:   PATIENT HAD A MESSAGE FROM [employer]. UNABLE TO TRANSFER TO OFFICE. PATIENT IS ON MEDICAID TEMPORARILY AND NEEDS ALL [facility]CRIPT[facility], HE NEEDS THE SUPPLIES FOR HIS INSULIN PUMP TO BE SENT [facility], PATIENT IS ALMOST OUT OF SUPPLIES FOR INSULIN PUMP.       Does the patient have less than a 3 day supply:  [x] Yes  [] No    Would you like a call back once the refill request has been completed: [] Yes [x] No    If the office needs to give you a call back, can they leave a voicemail: [x] Yes [] No    Twyla Rivera   25 12:26 EDT

## 2025-05-14 DIAGNOSIS — E10.65 TYPE 1 DIABETES MELLITUS WITH HYPERGLYCEMIA: ICD-10-CM

## 2025-05-14 RX ORDER — INSULIN ASPART 100 [IU]/ML
INJECTION, SOLUTION INTRAVENOUS; SUBCUTANEOUS
Qty: 30 ML | Refills: 2 | OUTPATIENT
Start: 2025-05-14

## 2025-05-14 RX ORDER — PROCHLORPERAZINE 25 MG/1
SUPPOSITORY RECTAL
Qty: 3 EACH | Refills: 5 | OUTPATIENT
Start: 2025-05-14 | End: 2025-11-10

## 2025-05-14 RX ORDER — PROCHLORPERAZINE 25 MG/1
SUPPOSITORY RECTAL
Qty: 3 EACH | Refills: 5 | Status: SHIPPED | OUTPATIENT
Start: 2025-05-14 | End: 2025-11-10

## 2025-05-14 RX ORDER — INSULIN LISPRO 100 [IU]/ML
INJECTION, SOLUTION INTRAVENOUS; SUBCUTANEOUS
Qty: 30 ML | Refills: 5 | OUTPATIENT
Start: 2025-05-14

## 2025-05-14 RX ORDER — PROCHLORPERAZINE 25 MG/1
1 SUPPOSITORY RECTAL
Qty: 1 EACH | Refills: 1 | OUTPATIENT
Start: 2025-05-14

## 2025-05-14 RX ORDER — PROCHLORPERAZINE 25 MG/1
1 SUPPOSITORY RECTAL
Qty: 1 EACH | Refills: 1 | Status: SHIPPED | OUTPATIENT
Start: 2025-05-14

## 2025-05-14 RX ORDER — INSULIN ASPART 100 [IU]/ML
INJECTION, SOLUTION INTRAVENOUS; SUBCUTANEOUS
Qty: 30 ML | Refills: 2 | Status: SHIPPED | OUTPATIENT
Start: 2025-05-14

## 2025-05-14 NOTE — TELEPHONE ENCOUNTER
Refused due to duplicate meds (Novolog and Humalog) attached to order. Will order Novolog, Dexcom G6 sensors and transmitter. Patient was on Humalog for a duration while without insurance to utilize the [employer] $35 per month cash copay card.

## 2025-05-15 DIAGNOSIS — E78.2 MIXED HYPERLIPIDEMIA: ICD-10-CM

## 2025-05-15 DIAGNOSIS — I10 PRIMARY HYPERTENSION: ICD-10-CM

## 2025-05-15 RX ORDER — PROCHLORPERAZINE 25 MG/1
SUPPOSITORY RECTAL
Qty: 3 EACH | Refills: 5 | OUTPATIENT
Start: 2025-05-15 | End: 2025-11-11

## 2025-05-15 RX ORDER — PROCHLORPERAZINE 25 MG/1
1 SUPPOSITORY RECTAL
Qty: 1 EACH | Refills: 1 | OUTPATIENT
Start: 2025-05-15

## 2025-05-15 RX ORDER — ATORVASTATIN CALCIUM 10 MG/1
10 TABLET, FILM COATED ORAL DAILY
Qty: 90 TABLET | Refills: 1 | Status: SHIPPED | OUTPATIENT
Start: 2025-05-15

## 2025-05-15 RX ORDER — LOSARTAN POTASSIUM 25 MG/1
25 TABLET ORAL DAILY
Qty: 90 TABLET | Refills: 1 | Status: SHIPPED | OUTPATIENT
Start: 2025-05-15 | End: 2025-11-11

## 2025-05-15 RX ORDER — INSULIN ASPART 100 [IU]/ML
INJECTION, SOLUTION INTRAVENOUS; SUBCUTANEOUS
Qty: 30 ML | Refills: 2 | OUTPATIENT
Start: 2025-05-15

## 2025-05-18 NOTE — PROGRESS NOTES
Chief Complaint  Diabetes (Med Mgt and A1C )    Referred By: CIARA Richey    Subjective          Patient or patient representative verbalized consent for the use of Ambient Listening during the visit with  CIARA Becerra for chart documentation. 5/20/2025  11:20 EDT    Parth Bright presents to [facility] for insulin pump management    History of Present Illness    History of Present Illness  The patient presents for evaluation of diabetes management.    He has been managing his diabetes with a tandem t:slim X2 insulin pump and Dexcom G6 device, which requires calibration every 3 to 4 days. He reports that the device occasionally loses signal for a few days before regaining it. He recently obtained temporary Medicaid and has been coordinating with [employer] for supplies. His current A1c has increased from 9.0% in 02/2025 to 9.8% today. He is currently on a 30-day supply of medication and has been experiencing issues with his insulin pump, necessitating a new pump order.    He reports weight gain since the winter season and expresses a desire to lose weight and return to his previous weight range of 160 to 170 pounds. He plans to increase his physical activity during the summer, including walking. His work schedule, which involves being outdoors in hot and sweaty conditions, may be contributing to skin irritation from the infusion sets.      Visit type:  follow-up  Diabetes type:  Type 1  Current diabetes status/concerns/issues:  No concerns  Other health concerns: No new health concerns  Current Diabetes symptoms:    Polyuria: Yes     Polydipsia: Yes     Polyphagia: No   Blurred vision: Yes     Excessive fatigue: No  Known Diabetes complications:  Neuropathy: Numbness and Shooting Pain; Location: Feet, Hands, and Bilateral  Renal: No current eGFR available and No current urine microalbumin available  Eyes: Moderate Nonproliferative Retinopathy and With  Macular Edema; Location: Bilateral  Amputation/Wounds: None  GI: Reflux and Indigestion  Cardiovascular: Hyperlipidemia and Hypertriglyceridemia  ED: Patient Reported  Other: None  Hypoglycemia:  None reported at this time  Hypoglycemia Symptoms:  No hypoglycemia at this time  Current diabetes treatment:  NovoLog per tandem t:slim X2 insulin pump  Blood glucose device:  Dexcom CGM  Blood glucose monitoring frequency:  Continuous per CGM  Blood glucose range/average:   unable to download due to technical difficulties  Glucose Source: Device Reviewed  Diet:  Carbohydrate Counting, Limits high carb/sweet foods, Avoids sugary drinks  Activity/Exercise:   Active outdoors and with work    Past Medical History:   Diagnosis Date    Diabetes mellitus type 1      Past Surgical History:   Procedure Laterality Date    KNEE SURGERY Right 07/25/2022     Family History   Problem Relation Age of Onset    Diabetes type I Mother      Social History     Socioeconomic History    Marital status:    Tobacco Use    Smoking status: Never    Smokeless tobacco: Former     Types: Chew   Vaping Use    Vaping status: Some Days    Substances: Nicotine   Substance and Sexual Activity    Alcohol use: Not Currently     Comment: occasionally on the weekends    Drug use: Never    Sexual activity: Defer     Allergies   Allergen Reactions    Penicillins Shortness Of Breath    Levofloxacin Other (See Comments)     KIDNEY FAILURE       Current Outpatient Medications:     atorvastatin (LIPITOR) 10 MG tablet, Take 1 tablet by mouth Daily., Disp: 90 tablet, Rfl: 1    Continuous Glucose Sensor (Dexcom G6 Sensor), Use Every 10 (Ten) Days for 180 days., Disp: 3 each, Rfl: 5    Continuous Glucose Transmitter (Dexcom G6 Transmitter) misc, Use 1 each Every 3 (Three) Months., Disp: 1 each, Rfl: 1    Insulin Aspart (NovoLOG) 100 UNIT/ML injection, 100 units per day per insulin pump., Disp: 30 mL, Rfl: 2    losartan (Cozaar) 25 MG tablet, Take 1 tablet by  "mouth Daily for 180 days., Disp: 90 tablet, Rfl: 1    tadalafil (Cialis) 10 MG tablet, Take 1 tablet by mouth As Needed for Erectile Dysfunction., Disp: 20 tablet, Rfl: 1    Blood Glucose Monitoring Suppl device, Use as directed for blood glucose monitoring, Disp: 1 each, Rfl: 0    glucose blood test strip, Test blood glucose 1 times each day for calibration verification of continuous glucose monitor, Disp: 100 each, Rfl: 1    Lancets misc, Test blood glucose 1 times each day for calibration and verification of continuous glucose monitor, Disp: 100 each, Rfl: 1    Objective     Vitals:    05/20/25 1102   BP: 141/92   BP Location: Left arm   Patient Position: Sitting   Cuff Size: Adult   Pulse: 89   SpO2: 99%   Weight: 95.3 kg (210 lb)   Height: 182.9 cm (72.01\")     Body mass index is 28.47 kg/m².    Physical Exam  Constitutional:       Appearance: Normal appearance.      Comments: Overweight (BMI 25 - 29.9) Pt Current BMI = 28.47      HENT:      Head: Normocephalic and atraumatic.      Right Ear: External ear normal.      Left Ear: External ear normal.      Nose: Nose normal.   Eyes:      Extraocular Movements: Extraocular movements intact.      Conjunctiva/sclera: Conjunctivae normal.   Pulmonary:      Effort: Pulmonary effort is normal.   Musculoskeletal:         General: Normal range of motion.      Cervical back: Normal range of motion.   Skin:     General: Skin is warm and dry.   Neurological:      General: No focal deficit present.      Mental Status: He is alert and oriented to person, place, and time. Mental status is at baseline.   Psychiatric:         Mood and Affect: Mood normal.         Behavior: Behavior normal.         Thought Content: Thought content normal.         Judgment: Judgment normal.         Result Review :   The following data was reviewed by: CIARA Becerra on 05/20/2025:    Most Recent A1C          5/20/2025    11:12   HGBA1C Most Recent   Hemoglobin A1C 9.8        A1C Last 3 " Results          12/17/2024    10:39 2/25/2025    08:55 5/20/2025    11:12   HGBA1C Last 3 Results   Hemoglobin A1C 8.8  9.0  9.8      A1c collected in the office today is 9.8%, indicating Uncontrolled Type II diabetes.  This result is up from the prior result of 9% collected on 2/25/2025    Glucose   Date Value Ref Range Status   07/22/2024 491 (C) 70 - 99 mg/dL Final     Comment:     Serial Number: [id number]:  [id number]     Creatinine   Date Value Ref Range Status   09/13/2024 0.89 0.76 - 1.27 mg/dL Final   06/28/2024 0.77 0.76 - 1.27 mg/dL Final     EGFR Result   Date Value Ref Range Status   09/13/2024 118 >59 mL/min/1.73 Final   06/28/2024 124 >59 mL/min/1.73 Final     Labs collected on 9/13/2024 show Normal values    Creatinine, Urine   Date Value Ref Range Status   08/16/2016 203.4 mg/dL Final     Total Cholesterol   Date Value Ref Range Status   08/16/2016 112 0 - 200 mg/dL Final     Triglycerides   Date Value Ref Range Status   09/13/2024 152 (H) 0 - 149 mg/dL Final   06/07/2024 76 0 - 149 mg/dL Final     HDL Cholesterol   Date Value Ref Range Status   09/13/2024 86 >39 mg/dL Final   06/07/2024 87 >39 mg/dL Final     LDL Chol Calc (NIH)   Date Value Ref Range Status   09/13/2024 119 (H) 0 - 99 mg/dL Final   06/07/2024 122 (H) 0 - 99 mg/dL Final     Lipid panel collected on 9/13/2024 shows Hyperlipidemia and Hypertriglyceridemia            Diagnoses and all orders for this visit:    1. Type 1 diabetes mellitus with hyperglycemia (Primary)  -     POC Glycosylated Hemoglobin (Hb A1C)  -     Continuous Glucose Sensor (Dexcom G6 Sensor); Use Every 10 (Ten) Days for 180 days.  Dispense: 3 each; Refill: 5  -     Continuous Glucose Transmitter (Dexcom G6 Transmitter) misc; Use 1 each Every 3 (Three) Months.  Dispense: 1 each; Refill: 1  -     Insulin Aspart (NovoLOG) 100 UNIT/ML injection; 100 units per day per insulin pump.  Dispense: 30 mL; Refill: 2  -     Blood Glucose Monitoring Suppl device; Use as  directed for blood glucose monitoring  Dispense: 1 each; Refill: 0  -     glucose blood test strip; Test blood glucose 1 times each day for calibration verification of continuous glucose monitor  Dispense: 100 each; Refill: 1  -     Lancets misc; Test blood glucose 1 times each day for calibration and verification of continuous glucose monitor  Dispense: 100 each; Refill: 1    2. Type 1 diabetes mellitus with diabetic polyneuropathy    3. Primary hypertension    4. Erectile dysfunction due to type 1 diabetes mellitus        Assessment & Plan  1. Diabetes Mellitus:  - A1c levels have increased from 9.0 on 02/25/2025 to 9.8% today, likely due to the absence of a sensor for automation of insulin delivery via insulin pump.  - A new pump has been ordered, and he is advised to bring it to the next appointment for setting adjustments. The latest software update allows for the use of Dexcom G6, Dexcom G7, or Marlin 2+, which can be rotated as needed.  - The T-slim antonio will enable him to manage his pump directly from his phone, including administering boluses without needing to access the pump physically. The infusion sets remain unchanged. A urine sample will be collected during the next visit to assess kidney function.  - Prescriptions for a transmitter, sensors, meter, test strips, and lancets have been sent to [facility] for the calibration and verification of the continuous glucose monitor. Insulin has also been prescribed and sent to [facility].    Follow-up: The patient will follow up in 6 weeks.    The patient will monitor his blood glucose levels per continuous glucose monitor.  If he develops problematic hyperglycemia or hypoglycemia or adverse drug reactions, he will contact the office for further instructions.        Follow Up     Return in about 6 weeks (around 7/1/2025) for CGM Follow-Up, Insulin Pump Follow-Up, Medication Management.    Patient was given instructions and counseling  regarding his condition or for health maintenance advice. Please see specific information pulled into the AVS if appropriate.     Brian Esparza, APRN  05/20/2025    Dictated Utilizing Dragon Dictation.  Please note that portions of this note were completed with a voice recognition program.  Part of this note may be an electronic transcription/translation of spoken language to printed text using the Dragon Dictation System.   Dupixent Pregnancy And Lactation Text: This medication likely crosses the placenta but the risk for the fetus is uncertain. This medication is excreted in breast milk.

## 2025-05-20 ENCOUNTER — OFFICE VISIT (OUTPATIENT)
Dept: DIABETES SERVICES | Facility: CLINIC | Age: 30
End: 2025-05-20
Payer: MEDICAID

## 2025-05-20 VITALS
OXYGEN SATURATION: 99 % | HEIGHT: 72 IN | BODY MASS INDEX: 28.44 KG/M2 | HEART RATE: 89 BPM | WEIGHT: 210 LBS | DIASTOLIC BLOOD PRESSURE: 92 MMHG | SYSTOLIC BLOOD PRESSURE: 141 MMHG

## 2025-05-20 DIAGNOSIS — E10.69 ERECTILE DYSFUNCTION DUE TO TYPE 1 DIABETES MELLITUS: ICD-10-CM

## 2025-05-20 DIAGNOSIS — I10 PRIMARY HYPERTENSION: ICD-10-CM

## 2025-05-20 DIAGNOSIS — E10.65 TYPE 1 DIABETES MELLITUS WITH HYPERGLYCEMIA: Primary | ICD-10-CM

## 2025-05-20 DIAGNOSIS — N52.9 ERECTILE DYSFUNCTION DUE TO TYPE 1 DIABETES MELLITUS: ICD-10-CM

## 2025-05-20 DIAGNOSIS — E10.42 TYPE 1 DIABETES MELLITUS WITH DIABETIC POLYNEUROPATHY: ICD-10-CM

## 2025-05-20 LAB
EXPIRATION DATE: ABNORMAL
HBA1C MFR BLD: 9.8 % (ref 4.5–5.7)
Lab: ABNORMAL

## 2025-05-20 PROCEDURE — 1160F RVW MEDS BY RX/DR IN RCRD: CPT

## 2025-05-20 PROCEDURE — 3046F HEMOGLOBIN A1C LEVEL >9.0%: CPT

## 2025-05-20 PROCEDURE — 3077F SYST BP >= 140 MM HG: CPT

## 2025-05-20 PROCEDURE — 3080F DIAST BP >= 90 MM HG: CPT

## 2025-05-20 PROCEDURE — 99214 OFFICE O/P EST MOD 30 MIN: CPT

## 2025-05-20 PROCEDURE — 1159F MED LIST DOCD IN RCRD: CPT

## 2025-05-20 RX ORDER — PROCHLORPERAZINE 25 MG/1
SUPPOSITORY RECTAL
Qty: 3 EACH | Refills: 5 | Status: SHIPPED | OUTPATIENT
Start: 2025-05-20 | End: 2025-11-16

## 2025-05-20 RX ORDER — INSULIN ASPART 100 [IU]/ML
INJECTION, SOLUTION INTRAVENOUS; SUBCUTANEOUS
Qty: 30 ML | Refills: 2 | Status: SHIPPED | OUTPATIENT
Start: 2025-05-20

## 2025-05-20 RX ORDER — PROCHLORPERAZINE 25 MG/1
1 SUPPOSITORY RECTAL
Qty: 1 EACH | Refills: 1 | Status: SHIPPED | OUTPATIENT
Start: 2025-05-20

## 2025-05-20 RX ORDER — AVOBENZONE, HOMOSALATE, OCTISALATE, OCTOCRYLENE 30; 40; 45; 26 MG/ML; MG/ML; MG/ML; MG/ML
CREAM TOPICAL
Qty: 100 EACH | Refills: 1 | Status: SHIPPED | OUTPATIENT
Start: 2025-05-20

## 2025-06-02 ENCOUNTER — TELEPHONE (OUTPATIENT)
Dept: DIABETES SERVICES | Facility: HOSPITAL | Age: 30
End: 2025-06-02
Payer: MEDICAID

## 2025-06-02 NOTE — TELEPHONE ENCOUNTER
Caller: Parth Bright    Relationship: Self    Best call back number:     [phone]       Equipment requested: PUMP    Reason for the request:     Prescribing Provider: CHANA    Additional information or concerns: PT CALLED TO SAY THAT HE RECEIVED THE NEW PUMP HOWEVER IT IS THE SAME OLD STYLE THAT HE HAS. HE WAS TOLD HE WOULD GET THE NEWER SMALLER ONE. PLEASE CALL PT AND ADVISE. HE DOES WORK 3RD SHIFT AND WOULD LIKE A CALL BACK BEFORE THEN

## 2025-06-11 NOTE — TELEPHONE ENCOUNTER
James approved for patient to return pump in exchange for Mobi. [employer] has attempted to contact patient to have him contact [employer] to initiate the exchange

## 2025-06-16 NOTE — PROGRESS NOTES
Chief Complaint  Diabetes    Referred By: CIARA Richey    Subjective          Patient or patient representative verbalized consent for the use of Ambient Listening during the visit with  CIARA Becerra for chart documentation. 6/17/2025  08:32 EDT    Parth Bright presents to [facility] for insulin pump management    History of Present Illness    History of Present Illness  The patient presents for evaluation of diabetes.    He has been experiencing difficulties with his insulin pump, which he believes may be due to internal issues. Despite increasing his insulin dosage, his blood glucose levels have remained elevated for the past 2 to 3 days. He has been abstaining from food in an attempt to lower his blood sugar levels, but this has not been effective. His blood glucose level was 126 today, but it took nearly the entire day to decrease. He also reports that his blood sugar levels occasionally remain high without any apparent reason.    The patient has not yet downloaded the [employer] antonio on his phone and does not have any insulin cartridges with him today. However, he does have a sufficient supply of cartridges for his Tandem X2 pump. He mentions that his A1c was 9.8 last month and has decreased to 9.6% today.      Visit type:  follow-up  Diabetes type:  Type 1  Current diabetes status/concerns/issues:  No concerns  Other health concerns: No new health concerns  Current Diabetes symptoms:    Polyuria: Yes     Polydipsia: Yes     Polyphagia: No   Blurred vision: Yes     Excessive fatigue: No  Known Diabetes complications:  Neuropathy: Numbness and Shooting Pain; Location: Feet, Hands, and Bilateral  Renal: No current urine microalbumin available and Normal eGFR per current labs  Eyes: Moderate Nonproliferative Retinopathy and With Macular Edema; Location: Bilateral  Amputation/Wounds: None  GI: Reflux and Indigestion  Cardiovascular: Hyperlipidemia and  Hypertriglyceridemia  ED: Patient Reported  Other: None  Hypoglycemia:  None reported at this time  Hypoglycemia Symptoms:  No hypoglycemia at this time  Current diabetes treatment:  NovoLog per tandem t:slim X2 insulin pump  Blood glucose device:  Meter  Blood glucose monitoring frequency:  2 -3  Blood glucose range/average:  have been running higher as of late  Glucose Source: Patient Reported  Diet:  Carbohydrate Counting, Limits high carb/sweet foods, Avoids sugary drinks  Activity/Exercise:  Active outdoors and with work    Past Medical History:   Diagnosis Date    Diabetes mellitus type 1      Past Surgical History:   Procedure Laterality Date    KNEE SURGERY Right 07/25/2022     Family History   Problem Relation Age of Onset    Diabetes type I Mother      Social History     Socioeconomic History    Marital status:    Tobacco Use    Smoking status: Never    Smokeless tobacco: Former     Types: Chew   Vaping Use    Vaping status: Some Days    Substances: Nicotine   Substance and Sexual Activity    Alcohol use: Not Currently     Comment: occasionally on the weekends    Drug use: Never    Sexual activity: Defer     Allergies   Allergen Reactions    Penicillins Shortness Of Breath    Levofloxacin Other (See Comments)     KIDNEY FAILURE       Current Outpatient Medications:     atorvastatin (LIPITOR) 10 MG tablet, Take 1 tablet by mouth Daily., Disp: 90 tablet, Rfl: 1    Blood Glucose Monitoring Suppl device, Use as directed for blood glucose monitoring, Disp: 1 each, Rfl: 0    Continuous Glucose Sensor (Dexcom G6 Sensor), Use Every 10 (Ten) Days for 180 days., Disp: 3 each, Rfl: 5    Continuous Glucose Transmitter (Dexcom G6 Transmitter) misc, Use 1 each Every 3 (Three) Months., Disp: 1 each, Rfl: 1    glucose blood test strip, Test blood glucose 1 times each day for calibration verification of continuous glucose monitor, Disp: 100 each, Rfl: 1    Insulin Aspart (NovoLOG) 100 UNIT/ML injection, 100 units  "per day per insulin pump., Disp: 30 mL, Rfl: 2    Insulin Lispro (humaLOG) 100 UNIT/ML injection, INJECT 4-18 UNITS SUBCUTANEOUSLY 4 TIMES DAILY VIA INSULIN PUMP AS DIRECTED, Disp: , Rfl:     Lancets misc, Test blood glucose 1 times each day for calibration and verification of continuous glucose monitor, Disp: 100 each, Rfl: 1    losartan (Cozaar) 25 MG tablet, Take 1 tablet by mouth Daily for 180 days., Disp: 90 tablet, Rfl: 1    tadalafil (Cialis) 10 MG tablet, Take 1 tablet by mouth As Needed for Erectile Dysfunction., Disp: 20 tablet, Rfl: 1    Objective     Vitals:    06/17/25 0823   BP: 152/93   BP Location: Left arm   Patient Position: Sitting   Cuff Size: Adult   Pulse: 91   SpO2: 100%   Weight: 99.3 kg (219 lb)   Height: 182.9 cm (72.01\")     Body mass index is 29.69 kg/m².    Physical Exam  Constitutional:       Appearance: Normal appearance.      Comments: Overweight (BMI 25 - 29.9) Pt Current BMI = 29.69      HENT:      Head: Normocephalic and atraumatic.      Right Ear: External ear normal.      Left Ear: External ear normal.      Nose: Nose normal.   Eyes:      Extraocular Movements: Extraocular movements intact.      Conjunctiva/sclera: Conjunctivae normal.   Pulmonary:      Effort: Pulmonary effort is normal.   Musculoskeletal:         General: Normal range of motion.      Cervical back: Normal range of motion.   Skin:     General: Skin is warm and dry.   Neurological:      General: No focal deficit present.      Mental Status: He is alert and oriented to person, place, and time. Mental status is at baseline.   Psychiatric:         Mood and Affect: Mood normal.         Behavior: Behavior normal.         Thought Content: Thought content normal.         Judgment: Judgment normal.         Result Review :   The following data was reviewed by: CIARA Becerra on 06/17/2025:    Most Recent A1C          6/17/2025    08:28   HGBA1C Most Recent   Hemoglobin A1C 9.6        A1C Last 3 Results          " 2/25/2025    08:55 5/20/2025    11:12 6/17/2025    08:28   HGBA1C Last 3 Results   Hemoglobin A1C 9.0  9.8  9.6      A1c collected in the office today is 9.6%, indicating Uncontrolled Type I diabetes.  This result is down from the prior result of 9.8% collected on 5/20/2025    Glucose   Date Value Ref Range Status   07/22/2024 491 (C) 70 - 99 mg/dL Final     Comment:     Serial Number: [id number]:  [id number]     Creatinine   Date Value Ref Range Status   09/13/2024 0.89 0.76 - 1.27 mg/dL Final   06/28/2024 0.77 0.76 - 1.27 mg/dL Final     EGFR Result   Date Value Ref Range Status   09/13/2024 118 >59 mL/min/1.73 Final   06/28/2024 124 >59 mL/min/1.73 Final     Labs collected on 9/13/2024 show Normal values    Creatinine, Urine   Date Value Ref Range Status   08/16/2016 203.4 mg/dL Final     Total Cholesterol   Date Value Ref Range Status   08/16/2016 112 0 - 200 mg/dL Final     Triglycerides   Date Value Ref Range Status   09/13/2024 152 (H) 0 - 149 mg/dL Final   06/07/2024 76 0 - 149 mg/dL Final     HDL Cholesterol   Date Value Ref Range Status   09/13/2024 86 >39 mg/dL Final   06/07/2024 87 >39 mg/dL Final     LDL Chol Calc (NIH)   Date Value Ref Range Status   09/13/2024 119 (H) 0 - 99 mg/dL Final   06/07/2024 122 (H) 0 - 99 mg/dL Final     Lipid panel collected on 9/13/2024 shows Hyperlipidemia and Hypertriglyceridemia            Diagnoses and all orders for this visit:    1. Type 1 diabetes mellitus with hyperglycemia (Primary)  -     POC Glycosylated Hemoglobin (Hb A1C)    2. Type 1 diabetes mellitus with diabetic polyneuropathy    3. Primary hypertension    4. Erectile dysfunction due to type 1 diabetes mellitus    5. Insulin pump in place    6. Uses self-applied continuous glucose monitoring device        Assessment & Plan  1. Diabetes Mellitus:  - His A1c levels have shown a slight improvement, decreasing from 9.8 to 9.6%.  - Patient has received his tandem Mobi insulin pump from [employer],  but did not receive the associated supplies including cartridges and infusion sets.  - He was advised to download the Mobi antonio on his phone.  Current and 1 pump settings were transitioned to the Mobi insulin pump, and once he receives the appropriate supplies from [employer] he will contact the office. He was instructed to use Dexcom G7 for continuous glucose monitoring (CGM) and to set alerts and sounds as needed. He was also guided on how to load the cartridge and update the pump settings.  - A follow-up appointment was scheduled for 07/29/2025 at 8:40 AM.    Follow-up: The patient will follow up on 07/29/2025 at 8:40 AM.    The patient will monitor his blood glucose levels per continuous glucose monitor.  If he develops problematic hyperglycemia or hypoglycemia or adverse drug reactions, he will contact the office for further instructions.        Follow Up     Return in about 1 month (around 7/17/2025) for CGM Follow-Up, Medication Management, Insulin Pump Follow-Up.    Patient was given instructions and counseling regarding his condition or for health maintenance advice. Please see specific information pulled into the AVS if appropriate.     Brian Esparza, CIARA  06/17/2025    Dictated Utilizing Dragon Dictation.  Please note that portions of this note were completed with a voice recognition program.  Part of this note may be an electronic transcription/translation of spoken language to printed text using the Dragon Dictation System.

## 2025-06-17 ENCOUNTER — OFFICE VISIT (OUTPATIENT)
Dept: DIABETES SERVICES | Facility: CLINIC | Age: 30
End: 2025-06-17
Payer: MEDICAID

## 2025-06-17 ENCOUNTER — TELEPHONE (OUTPATIENT)
Dept: DIABETES SERVICES | Facility: HOSPITAL | Age: 30
End: 2025-06-17
Payer: MEDICAID

## 2025-06-17 VITALS
HEIGHT: 72 IN | BODY MASS INDEX: 29.66 KG/M2 | WEIGHT: 219 LBS | HEART RATE: 91 BPM | DIASTOLIC BLOOD PRESSURE: 93 MMHG | OXYGEN SATURATION: 100 % | SYSTOLIC BLOOD PRESSURE: 152 MMHG

## 2025-06-17 DIAGNOSIS — N52.9 ERECTILE DYSFUNCTION DUE TO TYPE 1 DIABETES MELLITUS: ICD-10-CM

## 2025-06-17 DIAGNOSIS — Z97.8 USES SELF-APPLIED CONTINUOUS GLUCOSE MONITORING DEVICE: ICD-10-CM

## 2025-06-17 DIAGNOSIS — E10.42 TYPE 1 DIABETES MELLITUS WITH DIABETIC POLYNEUROPATHY: ICD-10-CM

## 2025-06-17 DIAGNOSIS — Z96.41 INSULIN PUMP IN PLACE: ICD-10-CM

## 2025-06-17 DIAGNOSIS — I10 PRIMARY HYPERTENSION: ICD-10-CM

## 2025-06-17 DIAGNOSIS — E10.69 ERECTILE DYSFUNCTION DUE TO TYPE 1 DIABETES MELLITUS: ICD-10-CM

## 2025-06-17 DIAGNOSIS — E10.65 TYPE 1 DIABETES MELLITUS WITH HYPERGLYCEMIA: Primary | ICD-10-CM

## 2025-06-17 LAB
EXPIRATION DATE: ABNORMAL
HBA1C MFR BLD: 9.6 % (ref 4.5–5.7)
Lab: ABNORMAL

## 2025-06-17 PROCEDURE — 3046F HEMOGLOBIN A1C LEVEL >9.0%: CPT

## 2025-06-17 PROCEDURE — 99214 OFFICE O/P EST MOD 30 MIN: CPT

## 2025-06-17 PROCEDURE — 3080F DIAST BP >= 90 MM HG: CPT

## 2025-06-17 PROCEDURE — 3077F SYST BP >= 140 MM HG: CPT

## 2025-06-17 RX ORDER — INSULIN LISPRO 100 [IU]/ML
INJECTION, SOLUTION INTRAVENOUS; SUBCUTANEOUS
COMMUNITY

## 2025-06-17 NOTE — TELEPHONE ENCOUNTER
Patient notified that [facility] cannot send his Mobi pump supplies until he returns the Tslim pump and supplies with the shipping label provided by [employer] on 6/6/25.    Patient verbalized understanding and states he will send supplies/pump back today.

## 2025-06-24 ENCOUNTER — TELEPHONE (OUTPATIENT)
Age: 30
End: 2025-06-24
Payer: MEDICAID

## 2025-06-24 DIAGNOSIS — E10.65 TYPE 1 DIABETES MELLITUS WITH HYPERGLYCEMIA: Primary | ICD-10-CM

## 2025-06-24 RX ORDER — INSULIN LISPRO 100 [IU]/ML
INJECTION, SOLUTION INTRAVENOUS; SUBCUTANEOUS
Qty: 30 ML | Refills: 1 | Status: SHIPPED | OUTPATIENT
Start: 2025-06-24

## 2025-06-24 NOTE — TELEPHONE ENCOUNTER
Pt called in stated that he is out of insulin. He is in between insurance changing and becoming activated. Wanted to know if we can send in or get lispro or something to cover cause it be about a week till he can get the insulin with new insurance